# Patient Record
Sex: MALE | Race: WHITE | NOT HISPANIC OR LATINO | Employment: OTHER | URBAN - METROPOLITAN AREA
[De-identification: names, ages, dates, MRNs, and addresses within clinical notes are randomized per-mention and may not be internally consistent; named-entity substitution may affect disease eponyms.]

---

## 2023-03-14 LAB — PROSTATE SPECIFIC AG (NG/ML) IN SER/PLAS: 1.06 NG/ML (ref 0–4)

## 2023-03-19 PROBLEM — M20.5X2 ACQUIRED HALLUX LIMITUS OF LEFT FOOT: Status: ACTIVE | Noted: 2023-03-19

## 2023-03-19 PROBLEM — M72.2 PLANTAR FASCIITIS: Status: ACTIVE | Noted: 2023-03-19

## 2023-03-19 PROBLEM — I10 HYPERTENSION: Status: ACTIVE | Noted: 2023-03-19

## 2023-03-19 PROBLEM — S99.922A INJURY OF FOOT, LEFT: Status: ACTIVE | Noted: 2023-03-19

## 2023-03-19 PROBLEM — N52.35 ERECTILE DYSFUNCTION FOLLOWING RADIATION THERAPY: Status: ACTIVE | Noted: 2023-03-19

## 2023-03-19 PROBLEM — R39.9 LOWER URINARY TRACT SYMPTOMS (LUTS): Status: ACTIVE | Noted: 2023-03-19

## 2023-03-19 PROBLEM — S86.302D: Status: ACTIVE | Noted: 2023-03-19

## 2023-03-19 PROBLEM — M67.02 CONTRACTURE OF LEFT ACHILLES TENDON: Status: ACTIVE | Noted: 2023-03-19

## 2023-03-19 PROBLEM — C61 ADENOCARCINOMA OF PROSTATE (MULTI): Status: ACTIVE | Noted: 2023-03-19

## 2023-03-19 PROBLEM — M20.22 ACQUIRED HALLUX RIGIDUS OF LEFT FOOT: Status: ACTIVE | Noted: 2023-03-19

## 2023-03-19 PROBLEM — R76.8 POSITIVE ANA (ANTINUCLEAR ANTIBODY): Status: ACTIVE | Noted: 2023-03-19

## 2023-03-19 PROBLEM — R60.0 EDEMA OF LEFT FOOT: Status: ACTIVE | Noted: 2023-03-19

## 2023-03-19 PROBLEM — N40.0 BENIGN ENLARGEMENT OF PROSTATE: Status: ACTIVE | Noted: 2023-03-19

## 2023-03-19 PROBLEM — M10.9 GOUT: Status: ACTIVE | Noted: 2023-03-19

## 2023-03-19 PROBLEM — Z79.818 ANDROGEN DEPRIVATION THERAPY: Status: ACTIVE | Noted: 2023-03-19

## 2023-03-19 PROBLEM — D18.03 LIVER HEMANGIOMA: Status: ACTIVE | Noted: 2023-03-19

## 2023-03-19 RX ORDER — TRIAMTERENE AND HYDROCHLOROTHIAZIDE 37.5; 25 MG/1; MG/1
1 CAPSULE ORAL DAILY
COMMUNITY
Start: 2021-04-20 | End: 2023-05-30

## 2023-03-19 RX ORDER — MELOXICAM 15 MG/1
15 TABLET ORAL DAILY PRN
COMMUNITY
End: 2024-05-16 | Stop reason: WASHOUT

## 2023-03-27 ENCOUNTER — APPOINTMENT (OUTPATIENT)
Dept: PRIMARY CARE | Facility: CLINIC | Age: 63
End: 2023-03-27
Payer: COMMERCIAL

## 2023-03-29 ENCOUNTER — OFFICE VISIT (OUTPATIENT)
Dept: PRIMARY CARE | Facility: CLINIC | Age: 63
End: 2023-03-29
Payer: COMMERCIAL

## 2023-03-29 VITALS
HEIGHT: 72 IN | OXYGEN SATURATION: 100 % | SYSTOLIC BLOOD PRESSURE: 102 MMHG | BODY MASS INDEX: 27.9 KG/M2 | WEIGHT: 206 LBS | DIASTOLIC BLOOD PRESSURE: 58 MMHG | HEART RATE: 58 BPM

## 2023-03-29 DIAGNOSIS — M1A.9XX0 CHRONIC GOUT WITHOUT TOPHUS, UNSPECIFIED CAUSE, UNSPECIFIED SITE: Primary | ICD-10-CM

## 2023-03-29 DIAGNOSIS — C61 ADENOCARCINOMA OF PROSTATE (MULTI): ICD-10-CM

## 2023-03-29 PROCEDURE — 1036F TOBACCO NON-USER: CPT | Performed by: INTERNAL MEDICINE

## 2023-03-29 PROCEDURE — 99213 OFFICE O/P EST LOW 20 MIN: CPT | Performed by: INTERNAL MEDICINE

## 2023-03-29 PROCEDURE — 3078F DIAST BP <80 MM HG: CPT | Performed by: INTERNAL MEDICINE

## 2023-03-29 PROCEDURE — 3074F SYST BP LT 130 MM HG: CPT | Performed by: INTERNAL MEDICINE

## 2023-03-29 NOTE — PROGRESS NOTES
Patient is here after seeing his rheumatologist they suggest switch his BP med he gets Gout flares and maybe contributing  to it

## 2023-05-17 LAB
PROSTATE SPECIFIC AG (NG/ML) IN SER/PLAS: 0.89 NG/ML (ref 0–4)
URATE (MG/DL) IN SER/PLAS: 10.2 MG/DL (ref 4–7.5)

## 2023-05-28 DIAGNOSIS — I10 HYPERTENSION, UNSPECIFIED TYPE: Primary | ICD-10-CM

## 2023-05-30 RX ORDER — TRIAMTERENE AND HYDROCHLOROTHIAZIDE 37.5; 25 MG/1; MG/1
CAPSULE ORAL
Qty: 30 CAPSULE | Refills: 2 | Status: SHIPPED | OUTPATIENT
Start: 2023-05-30 | End: 2023-08-21

## 2023-06-26 LAB — PROSTATE SPECIFIC AG (NG/ML) IN SER/PLAS: 0.68 NG/ML (ref 0–4)

## 2023-08-21 DIAGNOSIS — I10 HYPERTENSION, UNSPECIFIED TYPE: ICD-10-CM

## 2023-08-21 RX ORDER — TRIAMTERENE AND HYDROCHLOROTHIAZIDE 37.5; 25 MG/1; MG/1
CAPSULE ORAL
Qty: 30 CAPSULE | Refills: 2 | Status: SHIPPED | OUTPATIENT
Start: 2023-08-21 | End: 2023-11-17 | Stop reason: SDUPTHER

## 2023-08-30 LAB — PROSTATE SPECIFIC AG (NG/ML) IN SER/PLAS: 0.65 NG/ML (ref 0–4)

## 2023-11-17 DIAGNOSIS — I10 HYPERTENSION, UNSPECIFIED TYPE: ICD-10-CM

## 2023-11-17 RX ORDER — TRIAMTERENE AND HYDROCHLOROTHIAZIDE 37.5; 25 MG/1; MG/1
1 CAPSULE ORAL DAILY
Qty: 30 CAPSULE | Refills: 2 | Status: SHIPPED | OUTPATIENT
Start: 2023-11-17 | End: 2024-02-23 | Stop reason: SDUPTHER

## 2023-11-27 ENCOUNTER — LAB (OUTPATIENT)
Dept: LAB | Facility: LAB | Age: 63
End: 2023-11-27
Payer: COMMERCIAL

## 2023-11-27 DIAGNOSIS — C61 MALIGNANT NEOPLASM OF PROSTATE (MULTI): ICD-10-CM

## 2023-11-27 DIAGNOSIS — C61 MALIGNANT NEOPLASM OF PROSTATE (MULTI): Primary | ICD-10-CM

## 2023-11-27 LAB — PSA SERPL-MCNC: 1.03 NG/ML

## 2023-11-27 PROCEDURE — 84153 ASSAY OF PSA TOTAL: CPT

## 2023-11-27 PROCEDURE — 36415 COLL VENOUS BLD VENIPUNCTURE: CPT

## 2023-11-29 ENCOUNTER — TELEPHONE (OUTPATIENT)
Dept: RADIATION ONCOLOGY | Facility: HOSPITAL | Age: 63
End: 2023-11-29
Payer: COMMERCIAL

## 2023-12-01 ENCOUNTER — APPOINTMENT (OUTPATIENT)
Dept: LAB | Facility: CLINIC | Age: 63
End: 2023-12-01
Payer: COMMERCIAL

## 2023-12-05 ENCOUNTER — LAB (OUTPATIENT)
Dept: LAB | Facility: HOSPITAL | Age: 63
End: 2023-12-05
Payer: COMMERCIAL

## 2023-12-05 ENCOUNTER — HOSPITAL ENCOUNTER (OUTPATIENT)
Dept: RADIATION ONCOLOGY | Facility: HOSPITAL | Age: 63
Setting detail: RADIATION/ONCOLOGY SERIES
Discharge: HOME | End: 2023-12-05
Payer: COMMERCIAL

## 2023-12-05 VITALS
SYSTOLIC BLOOD PRESSURE: 130 MMHG | RESPIRATION RATE: 18 BRPM | HEART RATE: 81 BPM | WEIGHT: 214.07 LBS | HEIGHT: 72 IN | DIASTOLIC BLOOD PRESSURE: 80 MMHG | OXYGEN SATURATION: 97 % | TEMPERATURE: 97.5 F | BODY MASS INDEX: 28.99 KG/M2

## 2023-12-05 DIAGNOSIS — R31.0 GROSS HEMATURIA: ICD-10-CM

## 2023-12-05 DIAGNOSIS — R31.9 HEMATURIA, UNSPECIFIED TYPE: ICD-10-CM

## 2023-12-05 DIAGNOSIS — C61 ADENOCARCINOMA OF PROSTATE (MULTI): Primary | ICD-10-CM

## 2023-12-05 DIAGNOSIS — C61 ADENOCARCINOMA OF PROSTATE (MULTI): ICD-10-CM

## 2023-12-05 LAB
ALBUMIN SERPL BCP-MCNC: 4.7 G/DL (ref 3.4–5)
ALP SERPL-CCNC: 57 U/L (ref 33–136)
ALT SERPL W P-5'-P-CCNC: 14 U/L (ref 10–52)
ANION GAP SERPL CALC-SCNC: 14 MMOL/L (ref 10–20)
APPEARANCE UR: CLEAR
AST SERPL W P-5'-P-CCNC: 15 U/L (ref 9–39)
BILIRUB SERPL-MCNC: 0.6 MG/DL (ref 0–1.2)
BILIRUB UR STRIP.AUTO-MCNC: NEGATIVE MG/DL
BUN SERPL-MCNC: 23 MG/DL (ref 6–23)
CALCIUM SERPL-MCNC: 10.1 MG/DL (ref 8.6–10.3)
CHLORIDE SERPL-SCNC: 98 MMOL/L (ref 98–107)
CO2 SERPL-SCNC: 32 MMOL/L (ref 21–32)
COLOR UR: NORMAL
CREAT SERPL-MCNC: 1.27 MG/DL (ref 0.5–1.3)
ERYTHROCYTE [DISTWIDTH] IN BLOOD BY AUTOMATED COUNT: 11.2 % (ref 11.5–14.5)
GFR SERPL CREATININE-BSD FRML MDRD: 64 ML/MIN/1.73M*2
GLUCOSE SERPL-MCNC: 105 MG/DL (ref 74–99)
GLUCOSE UR STRIP.AUTO-MCNC: NEGATIVE MG/DL
HCT VFR BLD AUTO: 45.5 % (ref 41–52)
HGB BLD-MCNC: 16.1 G/DL (ref 13.5–17.5)
KETONES UR STRIP.AUTO-MCNC: NEGATIVE MG/DL
LEUKOCYTE ESTERASE UR QL STRIP.AUTO: NEGATIVE
MCH RBC QN AUTO: 30.4 PG (ref 26–34)
MCHC RBC AUTO-ENTMCNC: 35.4 G/DL (ref 32–36)
MCV RBC AUTO: 86 FL (ref 80–100)
NITRITE UR QL STRIP.AUTO: NEGATIVE
NRBC BLD-RTO: 0 /100 WBCS (ref 0–0)
PH UR STRIP.AUTO: 8 [PH]
PLATELET # BLD AUTO: 295 X10*3/UL (ref 150–450)
POTASSIUM SERPL-SCNC: 4.2 MMOL/L (ref 3.5–5.3)
PROT SERPL-MCNC: 7.4 G/DL (ref 6.4–8.2)
PROT UR STRIP.AUTO-MCNC: NEGATIVE MG/DL
PSA SERPL-MCNC: 0.84 NG/ML
RBC # BLD AUTO: 5.29 X10*6/UL (ref 4.5–5.9)
RBC # UR STRIP.AUTO: NEGATIVE /UL
SODIUM SERPL-SCNC: 140 MMOL/L (ref 136–145)
SP GR UR STRIP.AUTO: 1.01
UROBILINOGEN UR STRIP.AUTO-MCNC: <2 MG/DL
WBC # BLD AUTO: 10.2 X10*3/UL (ref 4.4–11.3)

## 2023-12-05 PROCEDURE — 84153 ASSAY OF PSA TOTAL: CPT

## 2023-12-05 PROCEDURE — 85027 COMPLETE CBC AUTOMATED: CPT

## 2023-12-05 PROCEDURE — 80053 COMPREHEN METABOLIC PANEL: CPT

## 2023-12-05 PROCEDURE — 81003 URINALYSIS AUTO W/O SCOPE: CPT

## 2023-12-05 PROCEDURE — 36415 COLL VENOUS BLD VENIPUNCTURE: CPT

## 2023-12-05 PROCEDURE — 99214 OFFICE O/P EST MOD 30 MIN: CPT

## 2023-12-05 ASSESSMENT — PATIENT HEALTH QUESTIONNAIRE - PHQ9
SUM OF ALL RESPONSES TO PHQ9 QUESTIONS 1 AND 2: 0
1. LITTLE INTEREST OR PLEASURE IN DOING THINGS: NOT AT ALL
2. FEELING DOWN, DEPRESSED OR HOPELESS: NOT AT ALL

## 2023-12-05 ASSESSMENT — ENCOUNTER SYMPTOMS
FEVER: 0
ANAL BLEEDING: 0
ARTHRALGIAS: 0
BACK PAIN: 0
RECTAL PAIN: 0
WEAKNESS: 0
PALPITATIONS: 0
SHORTNESS OF BREATH: 0
OCCASIONAL FEELINGS OF UNSTEADINESS: 0
ABDOMINAL PAIN: 0
DIARRHEA: 0
FATIGUE: 0
DEPRESSION: 0
FREQUENCY: 0
BLOOD IN STOOL: 0
COUGH: 0
HEMATURIA: 0
CHEST TIGHTNESS: 0
UNEXPECTED WEIGHT CHANGE: 0
DYSURIA: 0
LOSS OF SENSATION IN FEET: 0
PSYCHIATRIC NEGATIVE: 1
ALLERGIC/IMMUNOLOGIC NEGATIVE: 1
DIZZINESS: 0
CONSTIPATION: 0
DIFFICULTY URINATING: 0
JOINT SWELLING: 0

## 2023-12-05 ASSESSMENT — COLUMBIA-SUICIDE SEVERITY RATING SCALE - C-SSRS
6. HAVE YOU EVER DONE ANYTHING, STARTED TO DO ANYTHING, OR PREPARED TO DO ANYTHING TO END YOUR LIFE?: NO
2. HAVE YOU ACTUALLY HAD ANY THOUGHTS OF KILLING YOURSELF?: NO
1. IN THE PAST MONTH, HAVE YOU WISHED YOU WERE DEAD OR WISHED YOU COULD GO TO SLEEP AND NOT WAKE UP?: NO

## 2023-12-05 ASSESSMENT — PAIN SCALES - GENERAL: PAINLEVEL: 0-NO PAIN

## 2023-12-05 NOTE — ADDENDUM NOTE
Encounter addended by: EUGENIO Uriarte-DILIA on: 12/5/2023 9:49 AM   Actions taken: Order list changed, Diagnosis association updated

## 2023-12-21 ENCOUNTER — HOSPITAL ENCOUNTER (OUTPATIENT)
Dept: RADIOLOGY | Facility: HOSPITAL | Age: 63
Discharge: HOME | End: 2023-12-21
Payer: COMMERCIAL

## 2023-12-21 DIAGNOSIS — R31.9 HEMATURIA, UNSPECIFIED TYPE: ICD-10-CM

## 2023-12-21 PROCEDURE — 74178 CT ABD&PLV WO CNTR FLWD CNTR: CPT | Performed by: RADIOLOGY

## 2023-12-21 PROCEDURE — 76377 3D RENDER W/INTRP POSTPROCES: CPT | Performed by: RADIOLOGY

## 2023-12-21 PROCEDURE — 2550000001 HC RX 255 CONTRASTS

## 2023-12-21 PROCEDURE — 74178 CT ABD&PLV WO CNTR FLWD CNTR: CPT

## 2023-12-21 RX ADMIN — IOHEXOL 90 ML: 350 INJECTION, SOLUTION INTRAVENOUS at 08:30

## 2023-12-26 ENCOUNTER — OFFICE VISIT (OUTPATIENT)
Dept: UROLOGY | Facility: CLINIC | Age: 63
End: 2023-12-26
Payer: COMMERCIAL

## 2023-12-26 VITALS — TEMPERATURE: 96 F

## 2023-12-26 DIAGNOSIS — N40.0 BENIGN PROSTATIC HYPERPLASIA, UNSPECIFIED WHETHER LOWER URINARY TRACT SYMPTOMS PRESENT: Primary | ICD-10-CM

## 2023-12-26 DIAGNOSIS — R31.9 HEMATURIA, UNSPECIFIED TYPE: ICD-10-CM

## 2023-12-26 DIAGNOSIS — C61 ADENOCARCINOMA OF PROSTATE (MULTI): ICD-10-CM

## 2023-12-26 LAB
APPEARANCE UR: CLEAR
BILIRUB UR STRIP.AUTO-MCNC: NEGATIVE MG/DL
COLOR UR: YELLOW
GLUCOSE UR STRIP.AUTO-MCNC: NEGATIVE MG/DL
KETONES UR STRIP.AUTO-MCNC: NEGATIVE MG/DL
LEUKOCYTE ESTERASE UR QL STRIP.AUTO: NEGATIVE
NITRITE UR QL STRIP.AUTO: NEGATIVE
PH UR STRIP.AUTO: 6 [PH]
POC APPEARANCE, URINE: CLEAR
POC BILIRUBIN, URINE: NEGATIVE
POC BLOOD, URINE: ABNORMAL
POC COLOR, URINE: YELLOW
POC GLUCOSE, URINE: NEGATIVE MG/DL
POC KETONES, URINE: NEGATIVE MG/DL
POC LEUKOCYTES, URINE: NEGATIVE
POC NITRITE,URINE: NEGATIVE
POC PH, URINE: 6 PH
POC PROTEIN, URINE: NEGATIVE MG/DL
POC SPECIFIC GRAVITY, URINE: 1.02
POC UROBILINOGEN, URINE: 1 EU/DL
PROT UR STRIP.AUTO-MCNC: NEGATIVE MG/DL
RBC # UR STRIP.AUTO: NEGATIVE /UL
SP GR UR STRIP.AUTO: 1.01
UROBILINOGEN UR STRIP.AUTO-MCNC: <2 MG/DL

## 2023-12-26 PROCEDURE — 99214 OFFICE O/P EST MOD 30 MIN: CPT | Performed by: PHYSICIAN ASSISTANT

## 2023-12-26 PROCEDURE — 88112 CYTOPATH CELL ENHANCE TECH: CPT | Performed by: PATHOLOGY

## 2023-12-26 PROCEDURE — 88112 CYTOPATH CELL ENHANCE TECH: CPT

## 2023-12-26 PROCEDURE — 81002 URINALYSIS NONAUTO W/O SCOPE: CPT | Performed by: PHYSICIAN ASSISTANT

## 2023-12-26 PROCEDURE — 81003 URINALYSIS AUTO W/O SCOPE: CPT

## 2023-12-26 PROCEDURE — 87086 URINE CULTURE/COLONY COUNT: CPT

## 2023-12-26 PROCEDURE — 1036F TOBACCO NON-USER: CPT | Performed by: PHYSICIAN ASSISTANT

## 2023-12-26 ASSESSMENT — PAIN SCALES - GENERAL: PAINLEVEL: 0-NO PAIN

## 2023-12-27 PROBLEM — R31.0 GROSS HEMATURIA: Status: ACTIVE | Noted: 2023-12-27

## 2023-12-27 LAB — BACTERIA UR CULT: NO GROWTH

## 2023-12-27 ASSESSMENT — ENCOUNTER SYMPTOMS
EYES NEGATIVE: 1
HEMATURIA: 1
ALLERGIC/IMMUNOLOGIC NEGATIVE: 1
RESPIRATORY NEGATIVE: 1
CONSTITUTIONAL NEGATIVE: 1
PSYCHIATRIC NEGATIVE: 1
NEUROLOGICAL NEGATIVE: 1
ENDOCRINE NEGATIVE: 1
MUSCULOSKELETAL NEGATIVE: 1
GASTROINTESTINAL NEGATIVE: 1
HEMATOLOGIC/LYMPHATIC NEGATIVE: 1
CARDIOVASCULAR NEGATIVE: 1

## 2023-12-27 NOTE — ASSESSMENT & PLAN NOTE
Urine sent for cytology.   I discussed different reasons for gross hematuria including  malignancies and radiation cystitis.  I discussed CT results which shows negative upper tract imanaging. Will need to proceed with a cystoscopy for full evaluation.     Discussed cystoscopy technique and possible complications. Patient agrees with the plan.     I discussed presence of mild rectal wall thickening. I advised evaluation by colorectal. Referral placed.

## 2023-12-27 NOTE — PROGRESS NOTES
Subjective   Patient ID: Kevin Bowles is a 63 y.o. male who presents for No chief complaint on file..  HPI  63-year-old gentleman presents for follow-up. He has a history of [place] 3+4=7 prostate cancer, iPSA 11.29, s/p radiation completed in October 2021 and ADT x 1 presents sooner than scheduled due to gross hematuria.     Patient reports recurrent painless gross hematuria starting a few months ago when taking Advil. He denies increased urinary frequency or urgency. He denies dysuria.     He had a CT urogram which showed negative upper tract evaluation. Mild rectal wall thickening.       Review of Systems   Constitutional: Negative.    HENT: Negative.     Eyes: Negative.    Respiratory: Negative.     Cardiovascular: Negative.    Gastrointestinal: Negative.    Endocrine: Negative.    Genitourinary:  Positive for hematuria.   Musculoskeletal: Negative.    Skin: Negative.    Allergic/Immunologic: Negative.    Neurological: Negative.    Hematological: Negative.    Psychiatric/Behavioral: Negative.           Objective   Physical Exam  Constitutional:       General: He is not in acute distress.     Appearance: Normal appearance.   HENT:      Head: Normocephalic and atraumatic.      Nose: Nose normal.      Mouth/Throat:      Mouth: Mucous membranes are moist.   Cardiovascular:      Rate and Rhythm: Normal rate.   Pulmonary:      Effort: Pulmonary effort is normal.   Abdominal:      General: Abdomen is flat.      Palpations: Abdomen is soft.   Musculoskeletal:      Cervical back: Normal range of motion.   Neurological:      Mental Status: He is alert.         Assessment/Plan            Rosi Mendoza PA-C 12/27/23 12:13 AM

## 2023-12-29 LAB
LABORATORY COMMENT REPORT: NORMAL
LABORATORY COMMENT REPORT: NORMAL
PATH REPORT.FINAL DX SPEC: NORMAL
PATH REPORT.GROSS SPEC: NORMAL
PATH REPORT.RELEVANT HX SPEC: NORMAL
PATH REPORT.TOTAL CANCER: NORMAL
RESIDENT REVIEW: NORMAL

## 2024-01-15 NOTE — PROGRESS NOTES
ALEX Bowles is a 63 y.o. male with a history of [place] 3+4=7 prostate cancer, iPSA 11.29, s/p radiation completed in October 2021. CT urogram was obtained for new hematuria which demonstrated mild wall thickening of the rectum without significant adjacent fat stranding.     CT urogram 12/2023:   1. No hydroureteronephrosis or nephrolithiasis. No evidence of filling defects within the bilateral pelvocaliceal system or bilateral ureters on delayed phase imaging.  2. Unremarkable appearance of the urinary bladder without evidence of wall thickening. No evidence of intraluminal polypoid filling defects within the posterior half of the urinary bladder on delayed phase imaging, with limited evaluation of the anterior half of the non opacified urinary bladder lumen.  3. Mild wall thickening of the rectum without significant adjacent  fat stranding. Findings may be secondary to the collapsed state of the bowel, although correlate with symptoms of proctitis and consider follow-up proctoscopy if patient has not had recent colonoscopy.  4. Unchanged size of a 0.6 cm solid pulmonary nodule within the left lower lobe as compared with exam dated 07/14/2021, likely benign given its stability over time.    Colonoscopy 3/2020: Complete to IC valve. One 14mm polyp in the rectum removed with a hot snare. Resected and retrieved. The distal rectum and anal verge are normal on retroflexion views. The exam was otherwise normal. Repeat in 5 years.     Non-smoker/No ETOH/No Illicit drug use  PMH: prostate cancer, gout,   PSH:  No family history of CRC or IBD  Employment:     Past Medical History:   Diagnosis Date    Personal history of other diseases of the circulatory system     History of hypertension    Personal history of other diseases of the respiratory system 02/11/2020    History of asthma       Past Surgical History:   Procedure Laterality Date    OTHER SURGICAL HISTORY  05/03/2021    Oral surgery    OTHER SURGICAL  HISTORY  05/03/2021    Complete colonoscopy       No Known Allergies    Review of Systems    Physical Exam    Assessment and Plan:

## 2024-01-16 ENCOUNTER — APPOINTMENT (OUTPATIENT)
Dept: SURGERY | Facility: CLINIC | Age: 64
End: 2024-01-16
Payer: COMMERCIAL

## 2024-01-18 DIAGNOSIS — K63.9 DISEASE OF INTESTINE: Primary | ICD-10-CM

## 2024-01-18 RX ORDER — POLYETHYLENE GLYCOL 3350, SODIUM SULFATE ANHYDROUS, SODIUM BICARBONATE, SODIUM CHLORIDE, POTASSIUM CHLORIDE 236; 22.74; 6.74; 5.86; 2.97 G/4L; G/4L; G/4L; G/4L; G/4L
4000 POWDER, FOR SOLUTION ORAL ONCE
Qty: 4000 ML | Refills: 0 | Status: SHIPPED | OUTPATIENT
Start: 2024-01-18 | End: 2024-01-18

## 2024-01-25 ENCOUNTER — PROCEDURE VISIT (OUTPATIENT)
Dept: UROLOGY | Facility: HOSPITAL | Age: 64
End: 2024-01-25
Payer: COMMERCIAL

## 2024-01-25 VITALS — HEART RATE: 66 BPM | DIASTOLIC BLOOD PRESSURE: 84 MMHG | SYSTOLIC BLOOD PRESSURE: 147 MMHG

## 2024-01-25 DIAGNOSIS — R31.0 GROSS HEMATURIA: ICD-10-CM

## 2024-01-25 DIAGNOSIS — N40.1 BENIGN PROSTATIC HYPERPLASIA WITH LOWER URINARY TRACT SYMPTOMS, SYMPTOM DETAILS UNSPECIFIED: Primary | ICD-10-CM

## 2024-01-25 LAB
POC APPEARANCE, URINE: CLEAR
POC BILIRUBIN, URINE: NEGATIVE
POC BLOOD, URINE: NEGATIVE
POC COLOR, URINE: YELLOW
POC GLUCOSE, URINE: NEGATIVE MG/DL
POC KETONES, URINE: NEGATIVE MG/DL
POC LEUKOCYTES, URINE: NEGATIVE
POC NITRITE,URINE: NEGATIVE
POC PH, URINE: 7 PH
POC PROTEIN, URINE: NEGATIVE MG/DL
POC SPECIFIC GRAVITY, URINE: 1.02
POC UROBILINOGEN, URINE: 0.2 EU/DL

## 2024-01-25 PROCEDURE — 52000 CYSTOURETHROSCOPY: CPT | Performed by: UROLOGY

## 2024-01-25 NOTE — PROGRESS NOTES
HPI  Per Rosi Mendoza:  63-year-old gentleman presents for follow-up. He has a history of [place] 3+4=7 prostate cancer, iPSA 11.29, s/p radiation completed in October 2021 and ADT x 1 presents sooner than scheduled due to gross hematuria.      Patient reports recurrent painless gross hematuria starting a few months ago when taking Advil. He denies increased urinary frequency or urgency. He denies dysuria.     CTU 12/21/23:  IMPRESSION:  1.  No hydroureteronephrosis or nephrolithiasis. No evidence of  filling defects within the bilateral pelvocaliceal system or  bilateral ureters on delayed phase imaging.  2. Unremarkable appearance of the urinary bladder without evidence of  wall thickening. No evidence of intraluminal polypoid filling defects  within the posterior half of the urinary bladder on delayed phase  imaging, with limited evaluation of the anterior half of the non  opacified urinary bladder lumen.  3. Mild wall thickening of the rectum without significant adjacent  fat stranding. Findings may be secondary to the collapsed state of  the bowel, although correlate with symptoms of proctitis and consider  follow-up proctoscopy if patient has not had recent colonoscopy.  4. Unchanged size of a 0.6 cm solid pulmonary nodule within the left  lower lobe as compared with exam dated 07/14/2021, likely benign  given its stability over time.        1/25/24 - seen today for cysto due to gross hematuria per Rosi Mendoza.    Lab Results   Component Value Date    PSA 0.84 12/05/2023    PSA 1.03 11/27/2023    PSA 0.65 08/30/2023    PSA 0.68 06/26/2023    PSA 0.89 05/17/2023         Current Medications:  Current Outpatient Medications   Medication Sig Dispense Refill    meloxicam (Mobic) 15 mg tablet Take 1 tablet (15 mg) by mouth once daily as needed.      triamterene-hydrochlorothiazid (Dyazide) 37.5-25 mg capsule Take 1 capsule by mouth once daily. 30 capsule 2     No current facility-administered medications for  this visit.        Active Problems:  Kevin Bowles is a 63 y.o. male with the following Problems and Medications.  Patient Active Problem List   Diagnosis    Adenocarcinoma of prostate (CMS/HCC)    Benign enlargement of prostate    Erectile dysfunction following radiation therapy    Hypertension    Liver hemangioma    Acquired hallux limitus of left foot    Acquired hallux rigidus of left foot    Androgen deprivation therapy    Contracture of left Achilles tendon    Edema of left foot    Gout    Injury of foot, left    Peroneal tendon injury, left, subsequent encounter    Lower urinary tract symptoms (LUTS)    Plantar fasciitis    Positive LUPE (antinuclear antibody)    Gross hematuria     Current Outpatient Medications   Medication Sig Dispense Refill    meloxicam (Mobic) 15 mg tablet Take 1 tablet (15 mg) by mouth once daily as needed.      triamterene-hydrochlorothiazid (Dyazide) 37.5-25 mg capsule Take 1 capsule by mouth once daily. 30 capsule 2     No current facility-administered medications for this visit.       PMH:  Past Medical History:   Diagnosis Date    Personal history of other diseases of the circulatory system     History of hypertension    Personal history of other diseases of the respiratory system 02/11/2020    History of asthma       PSH:  Past Surgical History:   Procedure Laterality Date    OTHER SURGICAL HISTORY  05/03/2021    Oral surgery    OTHER SURGICAL HISTORY  05/03/2021    Complete colonoscopy       FMH:  Family History   Problem Relation Name Age of Onset    Thyroid disease Mother      Other (ELEVATED PSA) Father      Other (ELEVATED PSA) Brother         SHx:  Social History     Tobacco Use    Smoking status: Never    Smokeless tobacco: Never   Substance Use Topics    Alcohol use: Not Currently    Drug use: Never       Allergies:  No Known Allergies    Patient ID: Kevin Bowles is a 63 y.o. male.    Cystoscopy    Date/Time: 1/25/2024 11:10 AM    Performed by: Jaskaran Lipscomb,  MD  Authorized by: Jaskaran Lipscomb MD    Procedure - Bladder Cystoscopy:     Procedure details: cystoscopy    Procedure:  After informed consent was obtained, the patient was taken to the procedure room for cystoscopy due to gross hematuria.     Cystoscopy     Procedure Note:    A sterile prep and drape was performed in standard fashion. Lidocaine was used for topical anesthesia. A flexible cystoscope was inserted into the urethra without difficulty revealing normal urethra.     The prostate was small, non-obstructive.     Then entered the bladder revealing bladder mucosa with no erythematous patches or plaques, foreign bodies, stones or papillary lesions. The ureteral orifices were visualized bilaterally. These were orthotopic in location and effluxing clear urine. No masses were seen on retroflexion.     Post-Procedure:   The cystoscope was removed. The vital signs were stable . The patient tolerated the procedure well. There were no complications.     Assessment/Plan  Cysto today was normal. CTU nl, he is getting a colonoscopy next month. This completes his hematuria work up at this time. He will continue to follow up with Rosi Mendoza and Rad Onc for now, and follow up with me as needed.    Scribe Attestation  By signing my name below, I, Parvin Ahn, Dianna, attest that this documentation  has been prepared under the direction and in the presence of Jaskaran Lipscomb MD.

## 2024-02-20 ENCOUNTER — LAB (OUTPATIENT)
Dept: LAB | Facility: LAB | Age: 64
End: 2024-02-20
Payer: COMMERCIAL

## 2024-02-20 DIAGNOSIS — C61 ADENOCARCINOMA OF PROSTATE (MULTI): ICD-10-CM

## 2024-02-20 LAB — PSA SERPL-MCNC: 0.77 NG/ML

## 2024-02-20 PROCEDURE — 84153 ASSAY OF PSA TOTAL: CPT

## 2024-02-20 PROCEDURE — 36415 COLL VENOUS BLD VENIPUNCTURE: CPT

## 2024-02-23 ENCOUNTER — OFFICE VISIT (OUTPATIENT)
Dept: GASTROENTEROLOGY | Facility: EXTERNAL LOCATION | Age: 64
End: 2024-02-23
Payer: COMMERCIAL

## 2024-02-23 DIAGNOSIS — K63.9 COLON WALL THICKENING: ICD-10-CM

## 2024-02-23 DIAGNOSIS — D12.2 BENIGN NEOPLASM OF ASCENDING COLON: ICD-10-CM

## 2024-02-23 DIAGNOSIS — I10 HYPERTENSION, UNSPECIFIED TYPE: ICD-10-CM

## 2024-02-23 DIAGNOSIS — Z12.11 COLON CANCER SCREENING: Primary | ICD-10-CM

## 2024-02-23 DIAGNOSIS — Z86.010 PERSONAL HISTORY OF COLONIC POLYPS: ICD-10-CM

## 2024-02-23 PROCEDURE — 88305 TISSUE EXAM BY PATHOLOGIST: CPT | Performed by: PATHOLOGY

## 2024-02-23 PROCEDURE — 88305 TISSUE EXAM BY PATHOLOGIST: CPT

## 2024-02-23 PROCEDURE — 45385 COLONOSCOPY W/LESION REMOVAL: CPT | Performed by: INTERNAL MEDICINE

## 2024-02-23 PROCEDURE — 1036F TOBACCO NON-USER: CPT | Performed by: INTERNAL MEDICINE

## 2024-02-23 RX ORDER — TRIAMTERENE AND HYDROCHLOROTHIAZIDE 37.5; 25 MG/1; MG/1
1 CAPSULE ORAL DAILY
Qty: 30 CAPSULE | Refills: 2 | Status: SHIPPED | OUTPATIENT
Start: 2024-02-23 | End: 2024-05-16

## 2024-02-26 ENCOUNTER — LAB REQUISITION (OUTPATIENT)
Dept: LAB | Facility: HOSPITAL | Age: 64
End: 2024-02-26
Payer: COMMERCIAL

## 2024-02-29 LAB
LABORATORY COMMENT REPORT: NORMAL
PATH REPORT.FINAL DX SPEC: NORMAL
PATH REPORT.GROSS SPEC: NORMAL
PATH REPORT.RELEVANT HX SPEC: NORMAL
PATH REPORT.TOTAL CANCER: NORMAL

## 2024-02-29 NOTE — RESULT ENCOUNTER NOTE
The polyp removed from your colon was an adenoma (benign but precancerous).  The recommendation is to repeat colonoscopy in 5 years.      Jazzmine Benítez MD

## 2024-03-01 ENCOUNTER — TELEPHONE (OUTPATIENT)
Dept: RADIATION ONCOLOGY | Facility: HOSPITAL | Age: 64
End: 2024-03-01
Payer: COMMERCIAL

## 2024-03-04 ENCOUNTER — HOSPITAL ENCOUNTER (OUTPATIENT)
Dept: RADIATION ONCOLOGY | Facility: HOSPITAL | Age: 64
Setting detail: RADIATION/ONCOLOGY SERIES
Discharge: HOME | End: 2024-03-04
Payer: COMMERCIAL

## 2024-03-04 VITALS
SYSTOLIC BLOOD PRESSURE: 135 MMHG | HEART RATE: 57 BPM | DIASTOLIC BLOOD PRESSURE: 79 MMHG | TEMPERATURE: 97.7 F | RESPIRATION RATE: 18 BRPM | WEIGHT: 214.51 LBS | BODY MASS INDEX: 29.09 KG/M2 | OXYGEN SATURATION: 96 %

## 2024-03-04 DIAGNOSIS — C61 ADENOCARCINOMA OF PROSTATE (MULTI): Primary | ICD-10-CM

## 2024-03-04 DIAGNOSIS — C61 MALIGNANT NEOPLASM OF PROSTATE (MULTI): ICD-10-CM

## 2024-03-04 PROCEDURE — 99214 OFFICE O/P EST MOD 30 MIN: CPT

## 2024-03-04 ASSESSMENT — PATIENT HEALTH QUESTIONNAIRE - PHQ9
1. LITTLE INTEREST OR PLEASURE IN DOING THINGS: NOT AT ALL
SUM OF ALL RESPONSES TO PHQ9 QUESTIONS 1 AND 2: 0
2. FEELING DOWN, DEPRESSED OR HOPELESS: NOT AT ALL

## 2024-03-04 ASSESSMENT — ENCOUNTER SYMPTOMS
UNEXPECTED WEIGHT CHANGE: 0
ALLERGIC/IMMUNOLOGIC NEGATIVE: 1
WEAKNESS: 0
BACK PAIN: 0
OCCASIONAL FEELINGS OF UNSTEADINESS: 0
ARTHRALGIAS: 0
PSYCHIATRIC NEGATIVE: 1
CONSTIPATION: 0
RECTAL PAIN: 0
DIFFICULTY URINATING: 0
DYSURIA: 0
LOSS OF SENSATION IN FEET: 0
FEVER: 0
DIARRHEA: 0
CHEST TIGHTNESS: 0
JOINT SWELLING: 0
ABDOMINAL PAIN: 0
BLOOD IN STOOL: 0
HEMATURIA: 0
COUGH: 0
SHORTNESS OF BREATH: 0
PALPITATIONS: 0
DEPRESSION: 0
ANAL BLEEDING: 0
FATIGUE: 0
DIZZINESS: 0
FREQUENCY: 0

## 2024-03-04 ASSESSMENT — COLUMBIA-SUICIDE SEVERITY RATING SCALE - C-SSRS
1. IN THE PAST MONTH, HAVE YOU WISHED YOU WERE DEAD OR WISHED YOU COULD GO TO SLEEP AND NOT WAKE UP?: NO
2. HAVE YOU ACTUALLY HAD ANY THOUGHTS OF KILLING YOURSELF?: NO
6. HAVE YOU EVER DONE ANYTHING, STARTED TO DO ANYTHING, OR PREPARED TO DO ANYTHING TO END YOUR LIFE?: NO

## 2024-03-04 NOTE — PROGRESS NOTES
Cancer synopsis:  Rad/onc: Dr. Posadas/ [facility]  Urology: Dr. Mendoza    63 year old male with unfavorable intermediate risk adenocarcinoma of the prostate, grade group 2, [place]  7 (3+4), PSA 11.29ng/mL, [id number].  The patient has been followed with serial PSA measurements since at least 2008, WNL limits until January 2020 with elevated PSA at 6.1ng/mL, further elevated  on subsequent measurement in 2/2020 to 6.3ng/mL, 8 ng/mL on 7/23/2020, most recently 11.29ng/mL on 7/27/2021. Prostate biopsy on 6/16/2021 showed [place] 7 (3+4) adenocarcinoma involving 6/12 cores in the right base, mid, and apical gland involving up  to 80% of sampled tissue with + PNI, on US prostate gland measured ~33g. CT A/P and bone scan on 7/14/2021 showed a hypodense mass with peripheral nodular enhancement in the left lobe of the liver most likely representing hemangioma, punctate radiotracer  uptake in 2 midthoracic ribs.     6 months of ADT 10/20/2021     SBRT to prostate and SV completed 10/20/2021    History of presenting illness:    Patient ID: [id number]     Kevin Bowles is a 63 y.o. male who presents for UIR prostate cancer [place] 3+4=7, IPSA 11.29 now s/p RT and st-term ADT.    RT Site: prostate and SV  RT Date: 10/20/2021  Hormone therapy: Yes, st-term given 10/20/2021  Hot Flushes: Denies  Fatigue: Denies  Bone pain: Denies  LOPEZ: 4  ED: Admits to some ED but non concerned  ED medications: No  IPSS: 6  Urinary symptoms: At prior visit had developed new hematuria that was worked up with cysto and Ctu and found to be negative at this time. Has occurred 2-3 times per patient. Denies dysuria, fever, flank pain, urgency, frequency, urine leakage. Denies hx of renal stones.   Urinary Medications: No  Rectal bleeding: Denies  Colonoscopy: 02/2024, 1 polyp removed, repeat in 5yrs  Other systems: Denies SOB, CP or fever      Review of systems:  Review of Systems   Constitutional:  Negative for fatigue, fever and unexpected weight change.    Respiratory:  Negative for cough, chest tightness and shortness of breath.    Cardiovascular:  Negative for chest pain, palpitations and leg swelling.   Gastrointestinal:  Negative for abdominal pain, anal bleeding, blood in stool, constipation, diarrhea and rectal pain.   Endocrine: Negative for cold intolerance, heat intolerance and polyuria.   Genitourinary:  Negative for decreased urine volume, difficulty urinating, dysuria, frequency, hematuria and urgency.   Musculoskeletal:  Negative for arthralgias, back pain, gait problem and joint swelling.   Skin: Negative.    Allergic/Immunologic: Negative.    Neurological:  Negative for dizziness, syncope and weakness.   Psychiatric/Behavioral: Negative.         Past Medical history  Past Medical History:   Diagnosis Date    Personal history of other diseases of the circulatory system     History of hypertension    Personal history of other diseases of the respiratory system 02/11/2020    History of asthma        Surgical/family history  Family History   Problem Relation Name Age of Onset    Thyroid disease Mother      Other (ELEVATED PSA) Father      Other (ELEVATED PSA) Brother        Past Surgical History:   Procedure Laterality Date    OTHER SURGICAL HISTORY  05/03/2021    Oral surgery    OTHER SURGICAL HISTORY  05/03/2021    Complete colonoscopy        Social History  Tobacco Use: Low Risk  (3/4/2024)    Patient History     Smoking Tobacco Use: Never     Smokeless Tobacco Use: Never     Passive Exposure: Not on file         Current med list:  Current Outpatient Medications   Medication Instructions    meloxicam (MOBIC) 15 mg, oral, Daily PRN    triamterene-hydrochlorothiazid (Dyazide) 37.5-25 mg capsule 1 capsule, oral, Daily        Last recorded vital:  /79   Pulse 57   Temp 36.5 °C (97.7 °F) (Skin)   Resp 18   Wt 97.3 kg (214 lb 8.1 oz)   SpO2 96%   BMI 29.09 kg/m²     Physical exam  Physical Exam  Constitutional:       Appearance: Normal  appearance.   Cardiovascular:      Rate and Rhythm: Normal rate.   Pulmonary:      Effort: Pulmonary effort is normal.      Breath sounds: Normal breath sounds.   Musculoskeletal:         General: Normal range of motion.      Cervical back: Normal range of motion.   Neurological:      Mental Status: He is alert and oriented to person, place, and time.   Psychiatric:         Mood and Affect: Mood normal.         Behavior: Behavior normal.         Thought Content: Thought content normal.         Judgment: Judgment normal.         Pertinent labs:  Prostate Specific AG   Date/Time Value Ref Range Status   02/20/2024 11:42 AM 0.77 <=4.00 ng/mL Final         Dx:  Problem List Items Addressed This Visit       Adenocarcinoma of prostate (CMS/HCC) - Primary    Relevant Orders    Clinic Appointment Request Follow Up; [facility]; [facility] (Completed)    PSA of 0.77 was reviewed and is stable. Review of latent SE including rectal bleeding, hematuria, urinary strictures, ED where reviewed as well as how to contact office if s/s present. Admits to hematuria. Denies latent SE. NCCN guidelines where reviewed and routine FUV of every 3m for first year and every 6m for four years for a total of five years was discussed. Patient verbalized understanding.       PLAN:  FUV 3m  Labs PSA in 3  Imaging none  FUV other providers: PCP for routine evals, urology PRN      Please contact office with any concerns:  Mushtaq Mckay CNP  [phone]

## 2024-03-05 ENCOUNTER — OFFICE VISIT (OUTPATIENT)
Dept: UROLOGY | Facility: CLINIC | Age: 64
End: 2024-03-05
Payer: COMMERCIAL

## 2024-03-05 VITALS
SYSTOLIC BLOOD PRESSURE: 139 MMHG | BODY MASS INDEX: 29.04 KG/M2 | HEIGHT: 72 IN | HEART RATE: 50 BPM | TEMPERATURE: 97.2 F | WEIGHT: 214.4 LBS | DIASTOLIC BLOOD PRESSURE: 80 MMHG | RESPIRATION RATE: 15 BRPM

## 2024-03-05 DIAGNOSIS — N40.0 BENIGN PROSTATIC HYPERPLASIA, UNSPECIFIED WHETHER LOWER URINARY TRACT SYMPTOMS PRESENT: ICD-10-CM

## 2024-03-05 LAB
POC APPEARANCE, URINE: CLEAR
POC BILIRUBIN, URINE: NEGATIVE
POC BLOOD, URINE: NEGATIVE
POC COLOR, URINE: YELLOW
POC GLUCOSE, URINE: NEGATIVE MG/DL
POC KETONES, URINE: NEGATIVE MG/DL
POC LEUKOCYTES, URINE: NEGATIVE
POC NITRITE,URINE: NEGATIVE
POC PH, URINE: 7 PH
POC PROTEIN, URINE: NEGATIVE MG/DL
POC SPECIFIC GRAVITY, URINE: 1.01
POC UROBILINOGEN, URINE: 0.2 EU/DL

## 2024-03-05 PROCEDURE — 3075F SYST BP GE 130 - 139MM HG: CPT | Performed by: PHYSICIAN ASSISTANT

## 2024-03-05 PROCEDURE — 81003 URINALYSIS AUTO W/O SCOPE: CPT | Performed by: PHYSICIAN ASSISTANT

## 2024-03-05 PROCEDURE — 99213 OFFICE O/P EST LOW 20 MIN: CPT | Performed by: PHYSICIAN ASSISTANT

## 2024-03-05 PROCEDURE — 3079F DIAST BP 80-89 MM HG: CPT | Performed by: PHYSICIAN ASSISTANT

## 2024-03-05 PROCEDURE — 51798 US URINE CAPACITY MEASURE: CPT | Performed by: PHYSICIAN ASSISTANT

## 2024-03-05 PROCEDURE — 1036F TOBACCO NON-USER: CPT | Performed by: PHYSICIAN ASSISTANT

## 2024-03-05 ASSESSMENT — ENCOUNTER SYMPTOMS
RESPIRATORY NEGATIVE: 1
CONSTITUTIONAL NEGATIVE: 1
HEMATOLOGIC/LYMPHATIC NEGATIVE: 1
FREQUENCY: 1
NEUROLOGICAL NEGATIVE: 1
PSYCHIATRIC NEGATIVE: 1
CARDIOVASCULAR NEGATIVE: 1
MUSCULOSKELETAL NEGATIVE: 1
EYES NEGATIVE: 1
GASTROINTESTINAL NEGATIVE: 1
ALLERGIC/IMMUNOLOGIC NEGATIVE: 1
ENDOCRINE NEGATIVE: 1

## 2024-03-05 ASSESSMENT — PAIN SCALES - GENERAL: PAINLEVEL: 0-NO PAIN

## 2024-03-05 NOTE — PROGRESS NOTES
Subjective   Patient ID: Kevin Bowles is a 63 y.o. male who presents for No chief complaint on file..  HPI  Kevin Bowles is a 63 y.o. male who presents for No chief complaint on file..  HPI  63-year-old gentleman presents for follow-up. He has a history of [place] 3+4=7 prostate cancer, iPSA 11.29, s/p radiation completed in October 2021 and ADT x , gross hematuria with a negative evaluation seen for a follow up.     Patient doing well.  He denies bothersome urinary difficulties.  He reports increased frequency related to fluid intake.  He denies urgency, dysuria or hematuria.  He reports nocturia x 3-4 related to fluid intake as well.     UA negative  PVR 65  PS 2/20/24 A 0.77    Review of Systems   Constitutional: Negative.    HENT: Negative.     Eyes: Negative.    Respiratory: Negative.     Cardiovascular: Negative.    Gastrointestinal: Negative.    Endocrine: Negative.    Genitourinary:  Positive for frequency.   Musculoskeletal: Negative.    Skin: Negative.    Allergic/Immunologic: Negative.    Neurological: Negative.    Hematological: Negative.    Psychiatric/Behavioral: Negative.         Objective   Physical Exam  Constitutional:       General: He is not in acute distress.     Appearance: Normal appearance.   HENT:      Head: Normocephalic and atraumatic.      Nose: Nose normal.      Mouth/Throat:      Mouth: Mucous membranes are moist.   Cardiovascular:      Rate and Rhythm: Normal rate.   Pulmonary:      Effort: Pulmonary effort is normal.   Abdominal:      General: Abdomen is flat.      Palpations: Abdomen is soft.   Genitourinary:     Penis: Normal.       Testes: Normal.   Musculoskeletal:      Cervical back: Normal range of motion.   Neurological:      Mental Status: He is alert.         Assessment/Plan   Problem List Items Addressed This Visit             ICD-10-CM    Benign enlargement of prostate N40.0     Discussed urinary frequency and nocturia most likely related to fluid intake.  I advised  behavioral modifications reducing fluid intake 2 hours prior to going to bed.  Patient is not bothered by his symptoms and would like to continue observation.    Follow-up in 6 months or sooner as needed         Relevant Orders    Measure post void residual (Completed)    POCT UA Automated manually resulted (Completed)            Rosi Mendoza PA-C 03/05/24 10:41 AM

## 2024-03-05 NOTE — ASSESSMENT & PLAN NOTE
Discussed urinary frequency and nocturia most likely related to fluid intake.  I advised behavioral modifications reducing fluid intake 2 hours prior to going to bed.  Patient is not bothered by his symptoms and would like to continue observation.    Follow-up in 6 months or sooner as needed

## 2024-05-15 ENCOUNTER — LAB (OUTPATIENT)
Dept: LAB | Facility: LAB | Age: 64
End: 2024-05-15
Payer: COMMERCIAL

## 2024-05-15 DIAGNOSIS — I10 HYPERTENSION, UNSPECIFIED TYPE: ICD-10-CM

## 2024-05-15 DIAGNOSIS — C61 ADENOCARCINOMA OF PROSTATE (MULTI): ICD-10-CM

## 2024-05-15 DIAGNOSIS — M1A.09X0 CHRONIC GOUT OF MULTIPLE SITES, UNSPECIFIED CAUSE: ICD-10-CM

## 2024-05-15 LAB — PSA SERPL-MCNC: 0.64 NG/ML

## 2024-05-15 PROCEDURE — 36415 COLL VENOUS BLD VENIPUNCTURE: CPT

## 2024-05-15 PROCEDURE — 84153 ASSAY OF PSA TOTAL: CPT

## 2024-05-15 PROCEDURE — 84550 ASSAY OF BLOOD/URIC ACID: CPT

## 2024-05-16 ENCOUNTER — OFFICE VISIT (OUTPATIENT)
Dept: RHEUMATOLOGY | Facility: CLINIC | Age: 64
End: 2024-05-16
Payer: COMMERCIAL

## 2024-05-16 ENCOUNTER — TELEPHONE (OUTPATIENT)
Dept: RHEUMATOLOGY | Facility: CLINIC | Age: 64
End: 2024-05-16

## 2024-05-16 VITALS — SYSTOLIC BLOOD PRESSURE: 121 MMHG | HEART RATE: 85 BPM | DIASTOLIC BLOOD PRESSURE: 62 MMHG | RESPIRATION RATE: 18 BRPM

## 2024-05-16 DIAGNOSIS — M1A.09X0 CHRONIC GOUT OF MULTIPLE SITES, UNSPECIFIED CAUSE: Primary | ICD-10-CM

## 2024-05-16 DIAGNOSIS — R76.8 POSITIVE ANA (ANTINUCLEAR ANTIBODY): ICD-10-CM

## 2024-05-16 LAB — URATE SERPL-MCNC: 8.7 MG/DL (ref 4–7.5)

## 2024-05-16 PROCEDURE — 3074F SYST BP LT 130 MM HG: CPT | Performed by: STUDENT IN AN ORGANIZED HEALTH CARE EDUCATION/TRAINING PROGRAM

## 2024-05-16 PROCEDURE — 3078F DIAST BP <80 MM HG: CPT | Performed by: STUDENT IN AN ORGANIZED HEALTH CARE EDUCATION/TRAINING PROGRAM

## 2024-05-16 PROCEDURE — 99214 OFFICE O/P EST MOD 30 MIN: CPT | Performed by: STUDENT IN AN ORGANIZED HEALTH CARE EDUCATION/TRAINING PROGRAM

## 2024-05-16 RX ORDER — ALLOPURINOL 100 MG/1
100 TABLET ORAL DAILY
Qty: 90 TABLET | Refills: 3 | Status: SHIPPED | OUTPATIENT
Start: 2024-05-16 | End: 2025-05-11

## 2024-05-16 RX ORDER — TRIAMTERENE AND HYDROCHLOROTHIAZIDE 37.5; 25 MG/1; MG/1
1 CAPSULE ORAL DAILY
Qty: 30 CAPSULE | Refills: 2 | Status: SHIPPED | OUTPATIENT
Start: 2024-05-16

## 2024-05-16 RX ORDER — COLCHICINE 0.6 MG/1
0.6 TABLET ORAL 2 TIMES DAILY
Qty: 180 TABLET | Refills: 3 | Status: SHIPPED | OUTPATIENT
Start: 2024-05-16

## 2024-05-16 NOTE — PATIENT INSTRUCTIONS
Start allopurinol 1 pill a day 100 mg a day     Start colchicine 0.6 mg a day, twice a day during flare    Followup 6 months , uric acid before

## 2024-05-16 NOTE — PROGRESS NOTES
Subjective   Patient ID: Kevin Bowles is a 63 y.o. male who presents for Follow-up (Feels good per patient).  HPI:  male with hx of prostate cancer and gout,  first flare Jan 2023     Hx:  - had pain in his left ankle and foot in January 2023. He went to the Ortho clinic and he saw Andres ALVARENGA, who gave him meloxicam. The pain then went away after a couple days. He states his left foot, around his MTPs and also down the sides was a little bit red and very difficult to move for a couple days. He then saw orthopedics Dr. Whitaker who thought that the patient had a peroneal tendon injury and Planter fasciitis of the left foot. He did recommend that patient see me for positive LUPE that was noted.     Uric acid was checked and was 9.4. Patient has a history of left foot sprains.he Has morning stiffness in first couple steps in left foot. Has morning stiffness in back a couple min. Has dry eyes and dry mouth occasionally in winter     Mother has gout      -Dx with gout by myself 2/2023 based off clinical pic  First flare 1/2023  3 more flares between 5/2023 and 5/2024    Objective   /62 (BP Location: Left arm, Patient Position: Sitting, BP Cuff Size: Adult)   Pulse 85   Resp 18       Physical Exam  Constitutional: Alert and in no acute distress. Well developed, well nourished       Lab Results   Component Value Date    WBC 10.2 12/05/2023    HGB 16.1 12/05/2023    HCT 45.5 12/05/2023     12/05/2023    ALT 14 12/05/2023    AST 15 12/05/2023    CREATININE 1.27 12/05/2023          Lab Results   Component Value Date    ANAPATTRN HOMOGENEOUS 01/12/2023    ANATITER 1:80 01/12/2023    LUPE POSITIVE (A) 01/12/2023    ASSB <0.2 01/12/2023    ACEN <0.2 01/12/2023    SEDRATE 3 01/12/2023    CRP 0.76 01/12/2023    RF <10 01/12/2023   \\            There is currently no information documented on the homunculus. Go to the Rheumatology activity and complete the homunculus joint  exam.      Assessment/Plan:      #+LUPE  -nonspecific, HARLAN neg, low titer  -Positive LUPE can be a sign of an autoimmune disease. Only about 11 to 13% of people with a positive LUPE test have lupus. Up to 15% of completely healthy people have a positive LUPE test. So an LUPE test in and of itself, does not confirm any diagnosis of an autoimmune disease.     #Gout  -Patient  has gout; uric acid >10  family history, consistent history- will add on uric acid to bloodwork drawn  yesterday  -Start allopurinol given that uric acid is greater than 9, and patient has had 4 flares in total between 2023 in 2024  -Will start allopurinol 100 mg daily-1 year prescribed May 2024  -Will start colchicine 0.6 mg daily, but patient should take twice daily during flares-1 year prescribed May 2024  --Risks of allopurinol discussed including but not limited to allergic reaction. Patient understanding and aware of risks.  --Risks of colchicine discussed including but not limited to diarrhea.  Colchicine should be avoided in chronic kidney disease.  Patient understanding and aware of risks.  -Also discussed that HCTZ is associated with gout; Hydrochlorothiazide may cause hyperuricemia and precipitate gout or gouty arthritis in susceptible individuals (Uptodate)  -Triamterene may also cause increase in serum uric acid   -Given that HCTZ and triamterene is well controlling patient's blood pressure, PMD and patient prefer to continue this medication   -If a second blood pressure medication is ever needed in the future, losartan could be considered by PMD as it can reduce uric acid concentration  -Diet counseling done today, ACR handout on gout given  -discussed that the ideal recommendation would be for patient to follow-up every few months until we get his goal uric acid to 6; however, in order to minimize visits for this patient as he is feeling well, we can trial every 6 months    #Intolerance to nsaids  -Patient states that he uses NSAIDs for  gout flares; however, he states that his doctors told him to stop using it as it causes blood in his urine  -Patient counseled that instead of NSAIDs, he can use colchicine twice daily during flares  -If he has a severe flare, steroids can also be used.  Patient counseled to call me if this happens       Patient counseled to seek medical care if any new or worsening symptoms, urgently if needed.      Note will be sent to primary care doctor      Return to clinic in 6 mo, uric acid and CMP before, uric acid also added to labs drawn yesterday     Dragon dictation software was used to dictate this note. Errors may have occurred during dictation that was not intended by the user.

## 2024-06-10 ENCOUNTER — OFFICE VISIT (OUTPATIENT)
Dept: DERMATOLOGY | Facility: CLINIC | Age: 64
End: 2024-06-10
Payer: COMMERCIAL

## 2024-06-10 DIAGNOSIS — L57.0 ACTINIC KERATOSIS: ICD-10-CM

## 2024-06-10 DIAGNOSIS — L57.0 HYPERTROPHIC ACTINIC KERATOSIS: ICD-10-CM

## 2024-06-10 PROCEDURE — 99203 OFFICE O/P NEW LOW 30 MIN: CPT | Performed by: SPECIALIST

## 2024-06-10 PROCEDURE — 17003 DESTRUCT PREMALG LES 2-14: CPT | Performed by: SPECIALIST

## 2024-06-10 PROCEDURE — 17000 DESTRUCT PREMALG LESION: CPT | Performed by: SPECIALIST

## 2024-06-10 PROCEDURE — 11401 EXC TR-EXT B9+MARG 0.6-1 CM: CPT | Performed by: SPECIALIST

## 2024-06-10 ASSESSMENT — DERMATOLOGY QUALITY OF LIFE (QOL) ASSESSMENT
RATE HOW EMOTIONALLY BOTHERED YOU ARE BY YOUR SKIN PROBLEM (FOR EXAMPLE, WORRY, EMBARRASSMENT, FRUSTRATION): 0 - NEVER BOTHERED
RATE HOW BOTHERED YOU ARE BY SYMPTOMS OF YOUR SKIN PROBLEM (EG, ITCHING, STINGING BURNING, HURTING OR SKIN IRRITATION): 0 - NEVER BOTHERED
WHAT SINGLE SKIN CONDITION LISTED BELOW IS THE PATIENT ANSWERING THE QUALITY-OF-LIFE ASSESSMENT QUESTIONS ABOUT: NONE OF THE ABOVE
RATE HOW BOTHERED YOU ARE BY EFFECTS OF YOUR SKIN PROBLEMS ON YOUR ACTIVITIES (EG, GOING OUT, ACCOMPLISHING WHAT YOU WANT, WORK ACTIVITIES OR YOUR RELATIONSHIPS WITH OTHERS): 0 - NEVER BOTHERED
WHAT SINGLE SKIN CONDITION LISTED BELOW IS THE PATIENT ANSWERING THE QUALITY-OF-LIFE ASSESSMENT QUESTIONS ABOUT: NONE OF THE ABOVE
RATE HOW BOTHERED YOU ARE BY EFFECTS OF YOUR SKIN PROBLEMS ON YOUR ACTIVITIES (EG, GOING OUT, ACCOMPLISHING WHAT YOU WANT, WORK ACTIVITIES OR YOUR RELATIONSHIPS WITH OTHERS): 0 - NEVER BOTHERED
DATE THE QUALITY-OF-LIFE ASSESSMENT WAS COMPLETED: 67001
RATE HOW BOTHERED YOU ARE BY SYMPTOMS OF YOUR SKIN PROBLEM (EG, ITCHING, STINGING BURNING, HURTING OR SKIN IRRITATION): 0 - NEVER BOTHERED
RATE HOW EMOTIONALLY BOTHERED YOU ARE BY YOUR SKIN PROBLEM (FOR EXAMPLE, WORRY, EMBARRASSMENT, FRUSTRATION): 0 - NEVER BOTHERED

## 2024-06-10 ASSESSMENT — PATIENT GLOBAL ASSESSMENT (PGA): WHAT IS THE PGA: PATIENT GLOBAL ASSESSMENT:  1 - CLEAR

## 2024-06-10 NOTE — PROGRESS NOTES
Subjective   HPI: Kevin Bowles is a 63 y.o. male is here for evaluation and treatment of 2 spots on my face and 1 on my back..     ROS: No other skin or systemic complaints other than what is documented elsewhere in the note.    ALLERGIES: Patient has no known allergies.    SOCIAL:  reports that he has never smoked. He has never used smokeless tobacco. He reports that he does not currently use alcohol. He reports that he does not use drugs.    Objective     Description: Patient has a thickly crusted erythematous scaly plaque in a photodamaged area left lower back.  Diagnostic considerations would include irritated seborrheic keratosis or skin cell carcinoma.  Patient also has 2 erythematous scaly sensitive lesions photodamaged area on left temple and mid scalp.    Assessment/Plan   1. Hypertrophic actinic keratosis  Left Lower Back    Lesion biopsy - Left Lower Back    Specimen 1 - Dermatopathology- DERM LAB  Differential Diagnosis: ISK vs HAK  Check Margins Yes/No?:    Comments:    Dermpath Lab: Routine Histopathology (formalin-fixed tissue)    2. Actinic keratosis (2)  Mid Parietal Scalp; Left Temporal Scalp    Cryotherapy, skin lesion - Left Temporal Scalp, Mid Parietal Scalp       Plan: Shave excision of irritated seborrheic keratosis versus squamous cell carcinoma back.  Cryosurgical removal of 2 actinic keratoses involving his scalp and face.    FOLLOW UP: Patient will be called with biopsy results.    The patient was encouraged to contact me with any further questions or concerns.  Jaskaran Hood MD  6/10/2024

## 2024-09-03 ENCOUNTER — LAB (OUTPATIENT)
Dept: LAB | Facility: LAB | Age: 64
End: 2024-09-03
Payer: COMMERCIAL

## 2024-09-03 DIAGNOSIS — C61 MALIGNANT NEOPLASM OF PROSTATE (MULTI): ICD-10-CM

## 2024-09-03 LAB — PSA SERPL-MCNC: 0.53 NG/ML

## 2024-09-03 PROCEDURE — 84153 ASSAY OF PSA TOTAL: CPT

## 2024-09-03 PROCEDURE — 36415 COLL VENOUS BLD VENIPUNCTURE: CPT

## 2024-09-06 ENCOUNTER — TELEPHONE (OUTPATIENT)
Dept: RADIATION ONCOLOGY | Facility: HOSPITAL | Age: 64
End: 2024-09-06
Payer: COMMERCIAL

## 2024-09-06 ENCOUNTER — APPOINTMENT (OUTPATIENT)
Dept: RADIATION ONCOLOGY | Facility: HOSPITAL | Age: 64
End: 2024-09-06
Payer: COMMERCIAL

## 2024-09-06 NOTE — TELEPHONE ENCOUNTER
Called pt. to remind of appointment on 9/9/2024 at 2:30 pm with DILIA Denson. Pt's phone went to voicemail left number if needs to reschedule.

## 2024-09-09 ENCOUNTER — HOSPITAL ENCOUNTER (OUTPATIENT)
Dept: RADIATION ONCOLOGY | Facility: HOSPITAL | Age: 64
Setting detail: RADIATION/ONCOLOGY SERIES
Discharge: HOME | End: 2024-09-09
Payer: COMMERCIAL

## 2024-09-09 DIAGNOSIS — C61 MALIGNANT NEOPLASM OF PROSTATE (MULTI): Primary | ICD-10-CM

## 2024-09-09 PROCEDURE — 99213 OFFICE O/P EST LOW 20 MIN: CPT

## 2024-09-09 ASSESSMENT — ENCOUNTER SYMPTOMS
SHORTNESS OF BREATH: 0
DYSURIA: 0
DIARRHEA: 0
RECTAL PAIN: 0
DIZZINESS: 0
FATIGUE: 0
BLOOD IN STOOL: 0
BACK PAIN: 0
UNEXPECTED WEIGHT CHANGE: 0
FREQUENCY: 0
ANAL BLEEDING: 0
PSYCHIATRIC NEGATIVE: 1
WEAKNESS: 0
COUGH: 0
ALLERGIC/IMMUNOLOGIC NEGATIVE: 1
PALPITATIONS: 0
FEVER: 0
JOINT SWELLING: 0
ABDOMINAL PAIN: 0
HEMATURIA: 0
CONSTIPATION: 0
DIFFICULTY URINATING: 0
CHEST TIGHTNESS: 0
ARTHRALGIAS: 0

## 2024-09-09 NOTE — PROGRESS NOTES
Cancer synopsis:  Rad/onc: Dr. Posadas/ [facility]  Urology: Dr. 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  The patient has been followed with serial PSA measurements since at least 2008, WNL limits until January 2020 with elevated PSA at 6.1ng/mL, further elevated  on subsequent measurement in 2/2020 to 6.3ng/mL, 8 ng/mL on 7/23/2020, most recently 11.29ng/mL on 7/27/2021. Prostate biopsy on 6/16/2021 showed [place] 7 (3+4) adenocarcinoma involving 6/12 cores in the right base, mid, and apical gland involving up  to 80% of sampled tissue with + PNI, on US prostate gland measured ~33g. CT A/P and bone scan on 7/14/2021 showed a hypodense mass with peripheral nodular enhancement in the left lobe of the liver most likely representing hemangioma, punctate radiotracer  uptake in 2 midthoracic ribs.     6 months of ADT 10/20/2021     SBRT to prostate and SV completed 10/20/2021    History of presenting illness:    Patient ID: [id number]     Kevin Bowles is a 64 y.o. male who presents for UIR prostate cancer GS 3+4=7, IPSA 11.29 now s/p RT and st-term ADT.    RT Site: prostate and SV  RT Date: 10/20/2021  Hormone therapy: Yes, st-term given 10/20/2021  Hot Flushes: Denies  Fatigue: Denies  Bone pain: Denies  ED: Admits to some ED but non concerned  ED medications: No  IPSS: 6  Urinary symptoms: At prior visit had developed new hematuria that was worked up with cysto and Ctu and found to be negative at this time. Has occurred 2-3 times per patient. Denies dysuria, fever, flank pain, urgency, frequency, urine leakage. Denies hx of renal stones.   Urinary Medications: No  Rectal bleeding: Denies  Colonoscopy: 02/2024, 1 polyp removed, repeat in 5yrs  Other systems: Denies SOB, CP or fever    Review of systems:  Review of Systems   Constitutional:  Negative for fatigue, fever and unexpected weight change.    Respiratory:  Negative for cough, chest tightness and shortness of breath.    Cardiovascular:  Negative for chest pain, palpitations and leg swelling.   Gastrointestinal:  Negative for abdominal pain, anal bleeding, blood in stool, constipation, diarrhea and rectal pain.   Endocrine: Negative for cold intolerance, heat intolerance and polyuria.   Genitourinary:  Negative for decreased urine volume, difficulty urinating, dysuria, frequency, hematuria and urgency.   Musculoskeletal:  Negative for arthralgias, back pain, gait problem and joint swelling.   Skin: Negative.    Allergic/Immunologic: Negative.    Neurological:  Negative for dizziness, syncope and weakness.   Psychiatric/Behavioral: Negative.       Past Medical history  Past Medical History:   Diagnosis Date    Personal history of other diseases of the circulatory system     History of hypertension    Personal history of other diseases of the respiratory system 02/11/2020    History of asthma      Surgical/family history  Family History   Problem Relation Name Age of Onset    Thyroid disease Mother      Other (ELEVATED PSA) Father      Other (ELEVATED PSA) Brother        Past Surgical History:   Procedure Laterality Date    OTHER SURGICAL HISTORY  05/03/2021    Oral surgery    OTHER SURGICAL HISTORY  05/03/2021    Complete colonoscopy      Social History  Tobacco Use: Low Risk  (3/5/2024)    Patient History     Smoking Tobacco Use: Never     Smokeless Tobacco Use: Never     Passive Exposure: Not on file       Current med list:  Current Outpatient Medications   Medication Instructions    allopurinol (ZYLOPRIM) 100 mg, oral, Daily    colchicine 0.6 mg, oral, 2 times daily    triamterene-hydrochlorothiazid (Dyazide) 37.5-25 mg capsule 1 capsule, oral, Daily      Last recorded vital:  virtual    Physical exam  [facility]    Pertinent labs:  Prostate Specific AG   Date/Time Value Ref Range Status   09/03/2024 06:45 AM 0.53 <=4.00 ng/mL Final     Dx:  Problem List  Items Addressed This Visit    None  Visit Diagnoses       Malignant neoplasm of prostate (Multi)        Relevant Orders    Clinic Appointment Request Follow Up; [facility] (virtual); [facility] [address] [facility] (virtual) (Completed)         PSA of 0.53 was reviewed and is declining. Review of latent SE including rectal bleeding, hematuria, urinary strictures, ED where reviewed as well as how to contact office if s/s present. Admits to hematuria. Denies latent SE. NCCN guidelines where reviewed and routine FUV of every 3m for first year and every 6m for four years for a total of five years was discussed. Patient verbalized understanding.     PLAN:  FUV 6m   Labs PSA in 3 (per patient request), 6m  Imaging none  FUV other providers: PCP for routine evals, urology PRN    Please contact office with any concerns:  Mushtaq Mckay CNP  [phone]    I performed this visit using realtime telehealth tools, including an audio/video OR telephone connection between patient’s name and location Kevin Bowles and Mushtaq Jacobo AOCNP.    2. POS 10: Telehealth provided in patient's home.  o Patient is located in their home (which is a location other than a hospital or other  facility where the patient receives care in a private residence) when receiving  health services or health related services through telecommunication technology.

## 2024-09-10 ENCOUNTER — APPOINTMENT (OUTPATIENT)
Dept: UROLOGY | Facility: CLINIC | Age: 64
End: 2024-09-10
Payer: COMMERCIAL

## 2024-09-10 VITALS — TEMPERATURE: 98 F

## 2024-09-10 DIAGNOSIS — C61 ADENOCARCINOMA OF PROSTATE (MULTI): Primary | ICD-10-CM

## 2024-09-10 DIAGNOSIS — N40.1 BENIGN PROSTATIC HYPERPLASIA WITH LOWER URINARY TRACT SYMPTOMS, SYMPTOM DETAILS UNSPECIFIED: ICD-10-CM

## 2024-09-10 LAB
POC APPEARANCE, URINE: CLEAR
POC BILIRUBIN, URINE: NEGATIVE
POC BLOOD, URINE: NEGATIVE
POC COLOR, URINE: NORMAL
POC GLUCOSE, URINE: NEGATIVE MG/DL
POC KETONES, URINE: NEGATIVE MG/DL
POC LEUKOCYTES, URINE: NEGATIVE
POC NITRITE,URINE: NEGATIVE
POC PH, URINE: 6.5 PH
POC PROTEIN, URINE: NEGATIVE MG/DL
POC SPECIFIC GRAVITY, URINE: 1.01
POC UROBILINOGEN, URINE: 0.2 EU/DL

## 2024-09-10 PROCEDURE — 51798 US URINE CAPACITY MEASURE: CPT | Performed by: PHYSICIAN ASSISTANT

## 2024-09-10 PROCEDURE — 1036F TOBACCO NON-USER: CPT | Performed by: PHYSICIAN ASSISTANT

## 2024-09-10 PROCEDURE — 81003 URINALYSIS AUTO W/O SCOPE: CPT | Performed by: PHYSICIAN ASSISTANT

## 2024-09-10 PROCEDURE — 99213 OFFICE O/P EST LOW 20 MIN: CPT | Performed by: PHYSICIAN ASSISTANT

## 2024-09-10 ASSESSMENT — ENCOUNTER SYMPTOMS
GASTROINTESTINAL NEGATIVE: 1
MUSCULOSKELETAL NEGATIVE: 1
RESPIRATORY NEGATIVE: 1
ENDOCRINE NEGATIVE: 1
CONSTITUTIONAL NEGATIVE: 1
CARDIOVASCULAR NEGATIVE: 1
ALLERGIC/IMMUNOLOGIC NEGATIVE: 1
HEMATOLOGIC/LYMPHATIC NEGATIVE: 1
EYES NEGATIVE: 1
PSYCHIATRIC NEGATIVE: 1
NEUROLOGICAL NEGATIVE: 1

## 2024-09-10 ASSESSMENT — PAIN SCALES - GENERAL: PAINLEVEL: 0-NO PAIN

## 2024-09-10 NOTE — PROGRESS NOTES
Subjective   Patient ID: Kevin Bowles is a 64 y.o. male who presents for Benign Prostatic Hypertrophy.  Benign Prostatic Hypertrophy      Kevin Bowles is a 63 y.o. male who presents for No chief complaint on file..  HPI  63-year-old gentleman presents for follow-up. He has a history of Nino 3+4=7 prostate cancer, iPSA 11.29, s/p radiation completed in October 2021 and ADT x 6 months , gross hematuria with a negative evaluation seen for a follow up.     Patient doing well.  He denies bothersome urinary difficulties. Nocturia resolved on its own.  He denies urgency, dysuria or hematuria.     UA negative  PVR 66    Lab Results   Component Value Date    PSA 0.53 09/03/2024    PSA 0.64 05/15/2024    PSA 0.77 02/20/2024    PSA 0.84 12/05/2023    PSA 1.03 11/27/2023    PSA 0.65 08/30/2023    PSA 0.68 06/26/2023    PSA 0.89 05/17/2023    PSA 1.06 03/13/2023    PSA 0.93 01/12/2023       Review of Systems   Constitutional: Negative.    HENT: Negative.     Eyes: Negative.    Respiratory: Negative.     Cardiovascular: Negative.    Gastrointestinal: Negative.    Endocrine: Negative.    Musculoskeletal: Negative.    Skin: Negative.    Allergic/Immunologic: Negative.    Neurological: Negative.    Hematological: Negative.    Psychiatric/Behavioral: Negative.         Objective   Physical Exam  Constitutional:       General: He is not in acute distress.     Appearance: Normal appearance.   HENT:      Head: Normocephalic and atraumatic.      Nose: Nose normal.      Mouth/Throat:      Mouth: Mucous membranes are moist.   Cardiovascular:      Rate and Rhythm: Normal rate.   Pulmonary:      Effort: Pulmonary effort is normal.   Abdominal:      General: Abdomen is flat.      Palpations: Abdomen is soft.   Genitourinary:     Penis: Normal.       Testes: Normal.   Musculoskeletal:      Cervical back: Normal range of motion.   Neurological:      Mental Status: He is alert.         Assessment/Plan   Problem List Items Addressed This  Visit             ICD-10-CM    Benign enlargement of prostate N40.0    Relevant Orders    Measure post void residual (Completed)    POCT UA Automated manually resulted (Completed)   Prostate cancer  Monitored by radiation oncology.   PSA levels improving   Per pt request PSA repeated every 3 months    BPH  Symptoms stable. No need for treatment at this time    Follow up in 1 year or sooner as needed         Rosi Mendoza PA-C 09/10/24 12:52 PM

## 2024-09-18 DIAGNOSIS — Z00.00 LABORATORY TESTS ORDERED AS PART OF A COMPLETE PHYSICAL EXAM (CPE): ICD-10-CM

## 2024-09-23 DIAGNOSIS — I10 HYPERTENSION, UNSPECIFIED TYPE: ICD-10-CM

## 2024-09-24 RX ORDER — TRIAMTERENE AND HYDROCHLOROTHIAZIDE 37.5; 25 MG/1; MG/1
1 CAPSULE ORAL DAILY
Qty: 90 CAPSULE | Refills: 3 | Status: SHIPPED | OUTPATIENT
Start: 2024-09-24

## 2024-09-25 ENCOUNTER — LAB (OUTPATIENT)
Dept: LAB | Facility: LAB | Age: 64
End: 2024-09-25
Payer: COMMERCIAL

## 2024-09-25 DIAGNOSIS — Z00.00 LABORATORY TESTS ORDERED AS PART OF A COMPLETE PHYSICAL EXAM (CPE): ICD-10-CM

## 2024-09-25 LAB
25(OH)D3 SERPL-MCNC: 38 NG/ML (ref 30–100)
ALT SERPL W P-5'-P-CCNC: 15 U/L (ref 10–52)
ANION GAP SERPL CALC-SCNC: 12 MMOL/L (ref 10–20)
APPEARANCE UR: CLEAR
BASOPHILS # BLD AUTO: 0.05 X10*3/UL (ref 0–0.1)
BASOPHILS NFR BLD AUTO: 0.8 %
BILIRUB UR STRIP.AUTO-MCNC: NEGATIVE MG/DL
BUN SERPL-MCNC: 15 MG/DL (ref 6–23)
CALCIUM SERPL-MCNC: 8.9 MG/DL (ref 8.6–10.6)
CHLORIDE SERPL-SCNC: 102 MMOL/L (ref 98–107)
CHOLEST SERPL-MCNC: 185 MG/DL (ref 0–199)
CHOLESTEROL/HDL RATIO: 4.1
CO2 SERPL-SCNC: 31 MMOL/L (ref 21–32)
COLOR UR: COLORLESS
CREAT SERPL-MCNC: 1.3 MG/DL (ref 0.5–1.3)
EGFRCR SERPLBLD CKD-EPI 2021: 62 ML/MIN/1.73M*2
EOSINOPHIL # BLD AUTO: 0.23 X10*3/UL (ref 0–0.7)
EOSINOPHIL NFR BLD AUTO: 3.5 %
ERYTHROCYTE [DISTWIDTH] IN BLOOD BY AUTOMATED COUNT: 11.4 % (ref 11.5–14.5)
EST. AVERAGE GLUCOSE BLD GHB EST-MCNC: 88 MG/DL
GLUCOSE SERPL-MCNC: 87 MG/DL (ref 74–99)
GLUCOSE UR STRIP.AUTO-MCNC: NORMAL MG/DL
HBA1C MFR BLD: 4.7 %
HCT VFR BLD AUTO: 41.6 % (ref 41–52)
HDLC SERPL-MCNC: 44.8 MG/DL
HGB BLD-MCNC: 14.8 G/DL (ref 13.5–17.5)
IMM GRANULOCYTES # BLD AUTO: 0.03 X10*3/UL (ref 0–0.7)
IMM GRANULOCYTES NFR BLD AUTO: 0.5 % (ref 0–0.9)
KETONES UR STRIP.AUTO-MCNC: NEGATIVE MG/DL
LDLC SERPL CALC-MCNC: 119 MG/DL
LEUKOCYTE ESTERASE UR QL STRIP.AUTO: NEGATIVE
LYMPHOCYTES # BLD AUTO: 1.34 X10*3/UL (ref 1.2–4.8)
LYMPHOCYTES NFR BLD AUTO: 20.3 %
MCH RBC QN AUTO: 31.4 PG (ref 26–34)
MCHC RBC AUTO-ENTMCNC: 35.6 G/DL (ref 32–36)
MCV RBC AUTO: 88 FL (ref 80–100)
MONOCYTES # BLD AUTO: 0.56 X10*3/UL (ref 0.1–1)
MONOCYTES NFR BLD AUTO: 8.5 %
NEUTROPHILS # BLD AUTO: 4.38 X10*3/UL (ref 1.2–7.7)
NEUTROPHILS NFR BLD AUTO: 66.4 %
NITRITE UR QL STRIP.AUTO: NEGATIVE
NON HDL CHOLESTEROL: 140 MG/DL (ref 0–149)
NRBC BLD-RTO: 0 /100 WBCS (ref 0–0)
PH UR STRIP.AUTO: 6.5 [PH]
PLATELET # BLD AUTO: 274 X10*3/UL (ref 150–450)
POTASSIUM SERPL-SCNC: 3.4 MMOL/L (ref 3.5–5.3)
PROT UR STRIP.AUTO-MCNC: NEGATIVE MG/DL
PSA SERPL-MCNC: 0.65 NG/ML
RBC # BLD AUTO: 4.72 X10*6/UL (ref 4.5–5.9)
RBC # UR STRIP.AUTO: NEGATIVE /UL
SODIUM SERPL-SCNC: 142 MMOL/L (ref 136–145)
SP GR UR STRIP.AUTO: 1.01
TRIGL SERPL-MCNC: 105 MG/DL (ref 0–149)
TSH SERPL-ACNC: 2.04 MIU/L (ref 0.44–3.98)
UROBILINOGEN UR STRIP.AUTO-MCNC: NORMAL MG/DL
VIT B12 SERPL-MCNC: 240 PG/ML (ref 211–911)
VLDL: 21 MG/DL (ref 0–40)
WBC # BLD AUTO: 6.6 X10*3/UL (ref 4.4–11.3)

## 2024-09-25 PROCEDURE — 81003 URINALYSIS AUTO W/O SCOPE: CPT

## 2024-09-25 PROCEDURE — 80048 BASIC METABOLIC PNL TOTAL CA: CPT

## 2024-09-25 PROCEDURE — 80061 LIPID PANEL: CPT

## 2024-09-25 PROCEDURE — 85025 COMPLETE CBC W/AUTO DIFF WBC: CPT

## 2024-09-25 PROCEDURE — 36415 COLL VENOUS BLD VENIPUNCTURE: CPT

## 2024-09-25 PROCEDURE — 82607 VITAMIN B-12: CPT

## 2024-09-25 PROCEDURE — 83036 HEMOGLOBIN GLYCOSYLATED A1C: CPT

## 2024-09-25 PROCEDURE — 82306 VITAMIN D 25 HYDROXY: CPT

## 2024-09-25 PROCEDURE — 84443 ASSAY THYROID STIM HORMONE: CPT

## 2024-09-25 PROCEDURE — 84153 ASSAY OF PSA TOTAL: CPT

## 2024-09-25 PROCEDURE — 84460 ALANINE AMINO (ALT) (SGPT): CPT

## 2024-09-29 ASSESSMENT — PROMIS GLOBAL HEALTH SCALE
RATE_GENERAL_HEALTH: VERY GOOD
RATE_AVERAGE_FATIGUE: MILD
RATE_SOCIAL_SATISFACTION: EXCELLENT
RATE_QUALITY_OF_LIFE: EXCELLENT
CARRYOUT_SOCIAL_ACTIVITIES: EXCELLENT
RATE_PHYSICAL_HEALTH: VERY GOOD
RATE_AVERAGE_PAIN: 0
RATE_MENTAL_HEALTH: EXCELLENT
EMOTIONAL_PROBLEMS: NEVER
CARRYOUT_PHYSICAL_ACTIVITIES: COMPLETELY

## 2024-10-01 ENCOUNTER — APPOINTMENT (OUTPATIENT)
Dept: PRIMARY CARE | Facility: CLINIC | Age: 64
End: 2024-10-01
Payer: COMMERCIAL

## 2024-10-01 VITALS
RESPIRATION RATE: 16 BRPM | OXYGEN SATURATION: 100 % | WEIGHT: 206 LBS | TEMPERATURE: 97.3 F | HEART RATE: 59 BPM | HEIGHT: 72 IN | SYSTOLIC BLOOD PRESSURE: 120 MMHG | DIASTOLIC BLOOD PRESSURE: 80 MMHG | BODY MASS INDEX: 27.9 KG/M2

## 2024-10-01 DIAGNOSIS — Z00.00 ROUTINE GENERAL MEDICAL EXAMINATION AT A HEALTH CARE FACILITY: Primary | ICD-10-CM

## 2024-10-01 DIAGNOSIS — J45.909 ASTHMA, UNSPECIFIED ASTHMA SEVERITY, UNSPECIFIED WHETHER COMPLICATED, UNSPECIFIED WHETHER PERSISTENT (HHS-HCC): ICD-10-CM

## 2024-10-01 DIAGNOSIS — Z23 NEED FOR VACCINE FOR TD (TETANUS-DIPHTHERIA): ICD-10-CM

## 2024-10-01 DIAGNOSIS — I10 HYPERTENSION, UNSPECIFIED TYPE: ICD-10-CM

## 2024-10-01 DIAGNOSIS — Z23 NEED FOR DIPHTHERIA-TETANUS-PERTUSSIS (TDAP) VACCINE: ICD-10-CM

## 2024-10-01 PROCEDURE — 90715 TDAP VACCINE 7 YRS/> IM: CPT | Performed by: INTERNAL MEDICINE

## 2024-10-01 PROCEDURE — 3008F BODY MASS INDEX DOCD: CPT | Performed by: INTERNAL MEDICINE

## 2024-10-01 PROCEDURE — 99396 PREV VISIT EST AGE 40-64: CPT | Performed by: INTERNAL MEDICINE

## 2024-10-01 PROCEDURE — 3074F SYST BP LT 130 MM HG: CPT | Performed by: INTERNAL MEDICINE

## 2024-10-01 PROCEDURE — 3079F DIAST BP 80-89 MM HG: CPT | Performed by: INTERNAL MEDICINE

## 2024-10-01 PROCEDURE — 90471 IMMUNIZATION ADMIN: CPT | Performed by: INTERNAL MEDICINE

## 2024-10-01 PROCEDURE — 99213 OFFICE O/P EST LOW 20 MIN: CPT | Performed by: INTERNAL MEDICINE

## 2024-10-01 RX ORDER — TRIAMTERENE AND HYDROCHLOROTHIAZIDE 37.5; 25 MG/1; MG/1
1 CAPSULE ORAL DAILY
Qty: 90 CAPSULE | Refills: 3 | Status: SHIPPED | OUTPATIENT
Start: 2024-10-01

## 2024-10-01 NOTE — PROGRESS NOTES
Subjective   Patient ID: Kevin Bowles is a 64 y.o. male who presents for Annual Exam.    Patient comes in for a physical examination, doing well over - all with no particular complaints.   Also in for laboratory review and health maintenance update.  Updating family history as well.      Asthma is under control , blood pressure good today     Review of Systems   All other systems reviewed and are negative.  General: Denies fever, chills, night sweats, changes in appetite  ENT:  Negative for ear pain, hearing loss, headache, difficulty swallowing  Eyes: Negative for recent visual changes, up to date with eye exams  Dermatologic: Negative for new skin conditions, rash  Respiratory: Negative for wheezing, shortness of breath, cough  Cardiovascular: Negative for chest pain, palpitations, or leg swelling  Gastrointestinal: Negative for nausea/vomiting, abdominal pain, changes in bowel habits  Genitourinary: Negative for dysuria, urgency, incontinence, frequency  Neurological:  Negative for headaches, tremors, dizziness, memory changes, confusion, weakness, paresthesias  Psychiatric:  Negative for anxiety, depression or conditions are stable with medications  Endocrine:  Negative for heat and cold intolerance, polyuria, polydipsia  Other:  All systems have been reviewed and are negative except as previously noted.      Previous history  Past Medical History:   Diagnosis Date    Personal history of other diseases of the circulatory system     History of hypertension    Personal history of other diseases of the respiratory system 02/11/2020    History of asthma     Past Surgical History:   Procedure Laterality Date    OTHER SURGICAL HISTORY  05/03/2021    Oral surgery    OTHER SURGICAL HISTORY  05/03/2021    Complete colonoscopy     Social History     Tobacco Use    Smoking status: Never    Smokeless tobacco: Never   Substance Use Topics    Alcohol use: Not Currently    Drug use: Never     Family History   Problem  Relation Name Age of Onset    Thyroid disease Mother      Other (ELEVATED PSA) Father      Other (ELEVATED PSA) Brother       No Known Allergies  Current Outpatient Medications   Medication Instructions    allopurinol (ZYLOPRIM) 100 mg, oral, Daily    colchicine 0.6 mg, oral, 2 times daily    triamterene-hydrochlorothiazid (Dyazide) 37.5-25 mg capsule 1 capsule, oral, Daily       Objective       PHYSICAL EXAM :  Vital Signs: as recorded above  General: Well groomed, well nourished   Orientation:  Alert , oriented to time, place , and person   Mood and Affect:  Cooperative , no apparent distress normal affect  Skin: Good color, good turgor  Eyes: Extra ocular muscle movements intact, anicteric sclerae  Neck: Supple, full range of movement  Chest: Normal breath sounds, normal chest wall exam, symmetric, good air entry, clear to auscultation  Heart: Regular rate and rhythm, without murmur, gallop, or rubs  Abdomen soft nontender no masses felt no hepatosplenomegaly, no rebound or guarding  BACK:  no CTLS spine tenderness, no flank tenderness  Extremities: full range of movement  bilateral UE and bilateral LE,  no lower extremity edema  Neurological: Alert, oriented, cranial nerves II-XII intact except for visual acuity  Sensation:  Intact   Gait: normal steady        Assessment/Plan   Kevin Bowles is a 64 y.o. male who presents for the concerns below:    Problem List Items Addressed This Visit       Hypertension    Relevant Medications    triamterene-hydrochlorothiazid (Dyazide) 37.5-25 mg capsule    Need for vaccine for Td (tetanus-diphtheria)     ASSESSMENT AND PLAN: Patient on examination is in good health, will do screening blood tests to screen for high cholesterol, diabetes, thyroid, Ekg if above 50 years old or earlier if with cardiac history. Patient should be taking enough calcium in a balanced diet For Male Patients Only:  Prostate cancer screening psa.Preventive Medicine: colon cancer screening by age 50  if no family history, balanced diet, and exercise as discussed. Seat belt use for injury prevention, living will. Substance use and /or tobacco use counseled when applicable. Alcohol use discussed, use designated . Immunizations TD age 50 and every 10 years. Pneumovax and shingles vaccine counseled. Yearly flu vaccine unless contraindicated. More than 50% of office visit time spent counseling the patient, questions were answered. If problems arise, patient is to call or come back in. It is understood that the responsibility of healthcare is shared by the patient by following a healthy lifestyle and following the plan above as discussed. Complete physical examination in a year Pending results, may need to come for a return office visit for follow-up.    HYPERTENSION PLAN : Stable   Follow low salt diet, exercise as discussed, weight control. Continue current medications or adjust accordingly. WIll obtain BMP, urine microalbumin,  make sure ophthalmology exam is up to date    .NEEDS TETANUS BOOSTER  afebrile no previous reactions to tdap  ASTHMA PLAN:  Stable Continue current regimen.   Hydration is important, water intake 6-8 glasses a day at least  Keep cool avoid sudden changes in temperature  Avoid fumes , smoking or exposure to smoking  Refills for inhalers, nebulizer unit dose medication.   Follow-up as planned.         Discussed with:   Return in :    Portions of this note were generated using digital voice recognition software, and may contain grammatical errors       Jc Guzmán MD  10/01/24  9:23 AM

## 2024-10-15 PROBLEM — J45.909 ASTHMA, UNSPECIFIED ASTHMA SEVERITY, UNSPECIFIED WHETHER COMPLICATED, UNSPECIFIED WHETHER PERSISTENT (HHS-HCC): Status: ACTIVE | Noted: 2024-10-15

## 2024-11-04 ENCOUNTER — LAB (OUTPATIENT)
Dept: LAB | Facility: LAB | Age: 64
End: 2024-11-04
Payer: COMMERCIAL

## 2024-11-04 DIAGNOSIS — M1A.09X0 CHRONIC GOUT OF MULTIPLE SITES, UNSPECIFIED CAUSE: ICD-10-CM

## 2024-11-04 LAB
ALBUMIN SERPL BCP-MCNC: 4.2 G/DL (ref 3.4–5)
ALP SERPL-CCNC: 65 U/L (ref 33–136)
ALT SERPL W P-5'-P-CCNC: 20 U/L (ref 10–52)
ANION GAP SERPL CALC-SCNC: 14 MMOL/L (ref 10–20)
AST SERPL W P-5'-P-CCNC: 20 U/L (ref 9–39)
BILIRUB SERPL-MCNC: 0.6 MG/DL (ref 0–1.2)
BUN SERPL-MCNC: 20 MG/DL (ref 6–23)
CALCIUM SERPL-MCNC: 9.1 MG/DL (ref 8.6–10.6)
CHLORIDE SERPL-SCNC: 98 MMOL/L (ref 98–107)
CO2 SERPL-SCNC: 33 MMOL/L (ref 21–32)
CREAT SERPL-MCNC: 1.19 MG/DL (ref 0.5–1.3)
EGFRCR SERPLBLD CKD-EPI 2021: 69 ML/MIN/1.73M*2
GLUCOSE SERPL-MCNC: 86 MG/DL (ref 74–99)
POTASSIUM SERPL-SCNC: 3.4 MMOL/L (ref 3.5–5.3)
PROT SERPL-MCNC: 6.8 G/DL (ref 6.4–8.2)
SODIUM SERPL-SCNC: 142 MMOL/L (ref 136–145)
URATE SERPL-MCNC: 7.2 MG/DL (ref 4–7.5)

## 2024-11-04 PROCEDURE — 84550 ASSAY OF BLOOD/URIC ACID: CPT

## 2024-11-04 PROCEDURE — 36415 COLL VENOUS BLD VENIPUNCTURE: CPT

## 2024-11-04 PROCEDURE — 80053 COMPREHEN METABOLIC PANEL: CPT

## 2024-11-11 ENCOUNTER — APPOINTMENT (OUTPATIENT)
Dept: RHEUMATOLOGY | Facility: CLINIC | Age: 64
End: 2024-11-11
Payer: COMMERCIAL

## 2024-11-11 VITALS — DIASTOLIC BLOOD PRESSURE: 71 MMHG | HEART RATE: 81 BPM | SYSTOLIC BLOOD PRESSURE: 112 MMHG

## 2024-11-11 DIAGNOSIS — M1A.09X0 CHRONIC GOUT OF MULTIPLE SITES, UNSPECIFIED CAUSE: ICD-10-CM

## 2024-11-11 PROCEDURE — 99214 OFFICE O/P EST MOD 30 MIN: CPT | Performed by: STUDENT IN AN ORGANIZED HEALTH CARE EDUCATION/TRAINING PROGRAM

## 2024-11-11 PROCEDURE — 3074F SYST BP LT 130 MM HG: CPT | Performed by: STUDENT IN AN ORGANIZED HEALTH CARE EDUCATION/TRAINING PROGRAM

## 2024-11-11 PROCEDURE — 3078F DIAST BP <80 MM HG: CPT | Performed by: STUDENT IN AN ORGANIZED HEALTH CARE EDUCATION/TRAINING PROGRAM

## 2024-11-11 PROCEDURE — 1036F TOBACCO NON-USER: CPT | Performed by: STUDENT IN AN ORGANIZED HEALTH CARE EDUCATION/TRAINING PROGRAM

## 2024-11-11 RX ORDER — COLCHICINE 0.6 MG/1
0.6 TABLET ORAL 2 TIMES DAILY
Qty: 180 TABLET | Refills: 3 | Status: SHIPPED | OUTPATIENT
Start: 2024-11-11

## 2024-11-11 RX ORDER — ALLOPURINOL 100 MG/1
200 TABLET ORAL DAILY
Qty: 180 TABLET | Refills: 3 | Status: SHIPPED | OUTPATIENT
Start: 2024-11-11 | End: 2025-11-06

## 2024-11-11 RX ORDER — ALLOPURINOL 100 MG/1
100 TABLET ORAL DAILY
Qty: 90 TABLET | Refills: 3 | Status: SHIPPED | OUTPATIENT
Start: 2024-11-11 | End: 2024-11-11

## 2024-11-11 NOTE — PROGRESS NOTES
Subjective   Patient ID: Kevin Bowles is a 64 y.o. male who presents for Follow-up (Patient in today for a routine follow up. No physical complaints or concerns.).  HPI:  male with hx of prostate cancer and gout,  first flare Jan 2023     Hx:  - had pain in his left ankle and foot in January 2023. He went to the Ortho clinic and he saw Andres ALVARENGA, who gave him meloxicam. The pain then went away after a couple days. He states his left foot, around his MTPs and also down the sides was a little bit red and very difficult to move for a couple days. He then saw orthopedics Dr. Whitaker who thought that the patient had a peroneal tendon injury and Planter fasciitis of the left foot. He did recommend that patient see me for positive LUPE that was noted.     Uric acid was checked and was 9.4. Patient has a history of left foot sprains.he Has morning stiffness in first couple steps in left foot. Has morning stiffness in back a couple min. Has dry eyes and dry mouth occasionally in winter     Mother has gout      -Dx with gout by myself 2/2023 based off clinical pic  First flare 1/2023  3 more flares between 5/2023 and 5/2024 11/2024 - followup,  has not flared since starting allopurinol 5/2024    Objective   /71 (BP Location: Left arm, Patient Position: Sitting, BP Cuff Size: Large adult)   Pulse 81       Physical Exam  Constitutional: Alert and in no acute distress. Well developed, well nourished       Lab Results   Component Value Date    WBC 6.6 09/25/2024    HGB 14.8 09/25/2024    HCT 41.6 09/25/2024     09/25/2024    ALT 20 11/04/2024    AST 20 11/04/2024    CREATININE 1.19 11/04/2024          Lab Results   Component Value Date    ANAPATTRN HOMOGENEOUS 01/12/2023    ANATITER 1:80 01/12/2023    LUPE POSITIVE (A) 01/12/2023    ASSB <0.2 01/12/2023    ACEN <0.2 01/12/2023    SEDRATE 3 01/12/2023    CRP 0.76 01/12/2023    RF <10 01/12/2023   \\            There is currently no information documented on the  homunculus. Go to the Rheumatology activity and complete the homunculus joint exam.      Assessment/Plan:      #+LUPE  -nonspecific, HARLAN neg, low titer  -Positive LUPE can be a sign of an autoimmune disease. Only about 11 to 13% of people with a positive LUPE test have lupus. Up to 15% of completely healthy people have a positive LUPE test. So an LUPE test in and of itself, does not confirm any diagnosis of an autoimmune disease.     #Gout  -Patient  has gout; uric acid >10  family history, consistent history  -continue allopurinol but go from from 100 mg to 200 daily-1 year prescribedNov 2024  -continue colchicine 0.6 mg daily, but patient should take twice daily during flares-1 year prescribed nov 2024  --Risks of allopurinol discussed including but not limited to allergic reaction. Patient understanding and aware of risks.  --Risks of colchicine discussed including but not limited to diarrhea.  Colchicine should be avoided in chronic kidney disease.  Patient understanding and aware of risks.  -Also discussed that HCTZ is associated with gout; Hydrochlorothiazide may cause hyperuricemia and precipitate gout or gouty arthritis in susceptible individuals (Uptodate)  -Triamterene may also cause increase in serum uric acid   -Given that HCTZ and triamterene is well controlling patient's blood pressure, PMD and patient prefer to continue this medication   -If a second blood pressure medication is ever needed in the future, losartan could be considered by PMD as it can reduce uric acid concentration  -Diet counseling done today, ACR handout on gout given  -discussed that the ideal recommendation would be for patient to follow-up every few months until we get his goal uric acid to 6; however, in order to minimize visits for this patient as he is feeling well, we can trial every 6 months    #Intolerance to nsaids  -Patient states that he uses NSAIDs for gout flares; however, he states that his doctors told him to stop using it  as it causes blood in his urine  -Patient counseled that instead of NSAIDs, he can use colchicine twice daily during flares  -If he has a severe flare, steroids can also be used.  Patient counseled to call me if this happens       Patient counseled to seek medical care if any new or worsening symptoms, urgently if needed.      Note will be sent to primary care doctor      Return to clinic in12 mo, uric acid and CMP before    Dragon dictation software was used to dictate this note. Errors may have occurred during dictation that was not intended by the user.

## 2024-11-11 NOTE — PATIENT INSTRUCTIONS
Continue colchicine daily   Increase allopurinol to 200 mg daily   Come back in  1 year lab before

## 2024-12-13 ENCOUNTER — LAB (OUTPATIENT)
Dept: LAB | Facility: LAB | Age: 64
End: 2024-12-13
Payer: COMMERCIAL

## 2024-12-13 DIAGNOSIS — C61 MALIGNANT NEOPLASM OF PROSTATE (MULTI): ICD-10-CM

## 2024-12-13 LAB — PSA SERPL-MCNC: 0.47 NG/ML

## 2024-12-13 PROCEDURE — 84153 ASSAY OF PSA TOTAL: CPT

## 2024-12-13 PROCEDURE — 36415 COLL VENOUS BLD VENIPUNCTURE: CPT

## 2025-03-10 ENCOUNTER — LAB (OUTPATIENT)
Dept: LAB | Facility: HOSPITAL | Age: 65
End: 2025-03-10
Payer: COMMERCIAL

## 2025-03-10 DIAGNOSIS — C61 MALIGNANT NEOPLASM OF PROSTATE (MULTI): Primary | ICD-10-CM

## 2025-03-10 LAB — PSA SERPL-MCNC: 0.41 NG/ML

## 2025-03-10 PROCEDURE — 84153 ASSAY OF PSA TOTAL: CPT

## 2025-03-10 PROCEDURE — 36415 COLL VENOUS BLD VENIPUNCTURE: CPT

## 2025-03-13 ENCOUNTER — TELEPHONE (OUTPATIENT)
Dept: RADIATION ONCOLOGY | Facility: HOSPITAL | Age: 65
End: 2025-03-13
Payer: COMMERCIAL

## 2025-03-13 ASSESSMENT — ENCOUNTER SYMPTOMS
DYSURIA: 0
FREQUENCY: 0
DIZZINESS: 0
ABDOMINAL PAIN: 0
DIFFICULTY URINATING: 0
HEMATURIA: 0
UNEXPECTED WEIGHT CHANGE: 0
COUGH: 0
PSYCHIATRIC NEGATIVE: 1
CHEST TIGHTNESS: 0
ALLERGIC/IMMUNOLOGIC NEGATIVE: 1
PALPITATIONS: 0
CONSTIPATION: 0
JOINT SWELLING: 0
ARTHRALGIAS: 0
RECTAL PAIN: 0
WEAKNESS: 0
FEVER: 0
BACK PAIN: 0
ANAL BLEEDING: 0
FATIGUE: 0
DIARRHEA: 0
BLOOD IN STOOL: 0
SHORTNESS OF BREATH: 0

## 2025-03-13 NOTE — PROGRESS NOTES
Cancer synopsis:  [facility]  Urology: Dr. 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  The patient has been followed with serial PSA measurements since at least 2008, WNL limits until January 2020 with elevated PSA at 6.1ng/mL, further elevated  on subsequent measurement in 2/2020 to 6.3ng/mL, 8 ng/mL on 7/23/2020, most recently 11.29ng/mL on 7/27/2021. Prostate biopsy on 6/16/2021 showed [place] 7 (3+4) adenocarcinoma involving 6/12 cores in the right base, mid, and apical gland involving up  to 80% of sampled tissue with + PNI, on US prostate gland measured ~33g. CT A/P and bone scan on 7/14/2021 showed a hypodense mass with peripheral nodular enhancement in the left lobe of the liver most likely representing hemangioma, punctate radiotracer  uptake in 2 midthoracic ribs.     6 months of ADT 10/20/2021     SBRT to prostate and SV completed 10/20/2021    History of presenting illness:    Patient ID: [id number]     Kevin Bowles is a 64 y.o. male who presents for UIR prostate cancer GS 3+4=7, IPSA 11.29 now s/p RT and st-term ADT.    RT Site: prostate and SV  RT Date: 10/20/2021  Hormone therapy: Yes, st-term given 10/20/2021  Hot Flushes: Denies  Fatigue: Denies  Bone pain: Denies  ED: Admits to some ED but not concerned  ED medications: No  IPSS: [facility] patient in [facility]  Urinary symptoms: Has hx of hematuria that was worked up with cysto and Ctu and found to be negative at this time. Did report recently having hematuria x1 however noticed occurred with mucinex usage as well. DDenies dysuria, fever, flank pain, urgency, frequency, urine leakage. Denies hx of renal stones. Reports frequency has resolved.  Urinary Medications: No  Rectal bleeding: Denies  Colonoscopy: 02/2024, 1 polyp removed, repeat in 5yrs  Other systems: Denies SOB, CP or fever    Review of systems:  Review of  Systems   Constitutional:  Negative for fatigue, fever and unexpected weight change.   Respiratory:  Negative for cough, chest tightness and shortness of breath.    Cardiovascular:  Negative for chest pain, palpitations and leg swelling.   Gastrointestinal:  Negative for abdominal pain, anal bleeding, blood in stool, constipation, diarrhea and rectal pain.   Endocrine: Negative for cold intolerance, heat intolerance and polyuria.   Genitourinary:  Negative for decreased urine volume, difficulty urinating, dysuria, frequency, hematuria and urgency.   Musculoskeletal:  Negative for arthralgias, back pain, gait problem and joint swelling.   Skin: Negative.    Allergic/Immunologic: Negative.    Neurological:  Negative for dizziness, syncope and weakness.   Psychiatric/Behavioral: Negative.       Past Medical history  Past Medical History:   Diagnosis Date    Personal history of other diseases of the circulatory system     History of hypertension    Personal history of other diseases of the respiratory system 02/11/2020    History of asthma      Surgical/family history  Family History   Problem Relation Name Age of Onset    Thyroid disease Mother      Other (ELEVATED PSA) Father      Other (ELEVATED PSA) Brother        Past Surgical History:   Procedure Laterality Date    OTHER SURGICAL HISTORY  05/03/2021    Oral surgery    OTHER SURGICAL HISTORY  05/03/2021    Complete colonoscopy      Social History  Tobacco Use: Low Risk  (1/30/2025)    Received from [facility]    Patient History     Smoking Tobacco Use: Never     Smokeless Tobacco Use: Never     Passive Exposure: Not on file       Current med list:  Current Outpatient Medications   Medication Instructions    allopurinol (ZYLOPRIM) 200 mg, oral, Daily    colchicine 0.6 mg, oral, 2 times daily    triamterene-hydrochlorothiazid (Dyazide) 37.5-25 mg capsule 1 capsule, oral, Daily      Last recorded vital:  virtual    Physical exam  [facility]    Pertinent  labs:  Prostate Specific AG   Date/Time Value Ref Range Status   03/10/2025 09:00 AM 0.41 <=4.00 ng/mL Final     Dx:  Problem List Items Addressed This Visit    None  Visit Diagnoses       Malignant neoplasm of prostate (Multi)    -  Primary    Relevant Orders    Clinic Appointment Request Follow Up; [facility] (virtual); [facility] [address] [facility] (virtual) (Completed)        PSA of 0.41 was reviewed and is declining. Review of latent SE including rectal bleeding, hematuria, urinary strictures, ED where reviewed as well as how to contact office if s/s present. Admits to hematuria. Denies latent SE. NCCN guidelines where reviewed and routine FUV of every 3m for first year and every 6m for four years for a total of five years was discussed. Patient verbalized understanding.     Hematuria:  Discussed given negative cysto and CT urogram w/ hematuria in the past will advise continue monitor and if persistent will advise for repeat testing to r/o other malignancies.    PLAN:  FUV 6m   Labs PSA in 3 (per patient request), 6m  Imaging none  FUV other providers: PCP for routine evals, urology PRN    Please contact office with any concerns:  Mushtaq Mkcay CNP  [phone]    I performed this visit using realtime telehealth tools, including an audio/video OR telephone connection between patient’s name and location Kevin Bowles and Mushtaq Jacobo AOCNP.    2. POS 10: Telehealth provided in patient's home.  o Patient is located in their home (which is a location other than a hospital or other facility where the patient receives care in a private residence) when receiving health services or health related services through telecommunication technology.

## 2025-03-14 ENCOUNTER — APPOINTMENT (OUTPATIENT)
Dept: RADIATION ONCOLOGY | Facility: HOSPITAL | Age: 65
End: 2025-03-14

## 2025-03-14 DIAGNOSIS — C61 MALIGNANT NEOPLASM OF PROSTATE (MULTI): Primary | ICD-10-CM

## 2025-03-14 DIAGNOSIS — R31.0 GROSS HEMATURIA: ICD-10-CM

## 2025-03-14 PROCEDURE — 99213 OFFICE O/P EST LOW 20 MIN: CPT | Mod: 95

## 2025-03-14 PROCEDURE — 99213 OFFICE O/P EST LOW 20 MIN: CPT

## 2025-04-29 ENCOUNTER — HOSPITAL ENCOUNTER (OUTPATIENT)
Dept: RADIOLOGY | Facility: HOSPITAL | Age: 65
Discharge: HOME | End: 2025-04-29
Payer: COMMERCIAL

## 2025-04-29 DIAGNOSIS — I10 HYPERTENSION, UNSPECIFIED TYPE: ICD-10-CM

## 2025-04-29 PROCEDURE — 75571 CT HRT W/O DYE W/CA TEST: CPT

## 2025-06-05 LAB
ABO GROUP BLD: NORMAL
PSA SERPL-MCNC: 0.43 NG/ML
RH BLD: NORMAL

## 2025-09-05 ENCOUNTER — LAB (OUTPATIENT)
Dept: LAB | Facility: HOSPITAL | Age: 65
End: 2025-09-05
Payer: COMMERCIAL

## 2025-09-05 DIAGNOSIS — C61 MALIGNANT NEOPLASM OF PROSTATE (MULTI): Primary | ICD-10-CM

## 2025-09-05 PROCEDURE — 36415 COLL VENOUS BLD VENIPUNCTURE: CPT

## 2025-09-10 ENCOUNTER — APPOINTMENT (OUTPATIENT)
Dept: UROLOGY | Facility: CLINIC | Age: 65
End: 2025-09-10
Payer: COMMERCIAL

## 2025-10-10 ENCOUNTER — APPOINTMENT (OUTPATIENT)
Dept: PRIMARY CARE | Facility: CLINIC | Age: 65
End: 2025-10-10
Payer: COMMERCIAL

## 2025-11-17 ENCOUNTER — APPOINTMENT (OUTPATIENT)
Dept: RHEUMATOLOGY | Facility: CLINIC | Age: 65
End: 2025-11-17
Payer: COMMERCIAL